# Patient Record
Sex: MALE | Race: WHITE | Employment: FULL TIME | URBAN - METROPOLITAN AREA
[De-identification: names, ages, dates, MRNs, and addresses within clinical notes are randomized per-mention and may not be internally consistent; named-entity substitution may affect disease eponyms.]

---

## 2017-03-24 ENCOUNTER — OFFICE VISIT (OUTPATIENT)
Dept: INTERNAL MEDICINE CLINIC | Age: 52
End: 2017-03-24

## 2017-03-24 VITALS
SYSTOLIC BLOOD PRESSURE: 102 MMHG | OXYGEN SATURATION: 98 % | DIASTOLIC BLOOD PRESSURE: 70 MMHG | BODY MASS INDEX: 30.52 KG/M2 | WEIGHT: 201.4 LBS | HEART RATE: 58 BPM | HEIGHT: 68 IN

## 2017-03-24 DIAGNOSIS — E66.9 OBESITY (BMI 30-39.9): Chronic | ICD-10-CM

## 2017-03-24 DIAGNOSIS — E78.2 MIXED HYPERLIPIDEMIA: Chronic | ICD-10-CM

## 2017-03-24 DIAGNOSIS — K62.5 RECTAL BLEEDING: Chronic | ICD-10-CM

## 2017-03-24 DIAGNOSIS — N40.1 BENIGN PROSTATIC HYPERPLASIA WITH LOWER URINARY TRACT SYMPTOMS, UNSPECIFIED MORPHOLOGY: ICD-10-CM

## 2017-03-24 DIAGNOSIS — R68.82 LOSS OF LIBIDO: ICD-10-CM

## 2017-03-24 DIAGNOSIS — K21.9 GASTROESOPHAGEAL REFLUX DISEASE WITHOUT ESOPHAGITIS: Chronic | ICD-10-CM

## 2017-03-24 DIAGNOSIS — K76.9 CHRONIC NONALCOHOLIC LIVER DISEASE: ICD-10-CM

## 2017-03-24 DIAGNOSIS — E11.9 TYPE 2 DIABETES MELLITUS WITHOUT COMPLICATION, WITHOUT LONG-TERM CURRENT USE OF INSULIN (HCC): Primary | Chronic | ICD-10-CM

## 2017-03-24 LAB
A/G RATIO: 1.4 (ref 1.1–2.2)
ALBUMIN SERPL-MCNC: 4.6 G/DL (ref 3.4–5)
ALP BLD-CCNC: 61 U/L (ref 40–129)
ALT SERPL-CCNC: 29 U/L (ref 10–40)
ANION GAP SERPL CALCULATED.3IONS-SCNC: 15 MMOL/L (ref 3–16)
AST SERPL-CCNC: 17 U/L (ref 15–37)
BASOPHILS ABSOLUTE: 0.1 K/UL (ref 0–0.2)
BASOPHILS RELATIVE PERCENT: 1 %
BILIRUB SERPL-MCNC: 0.6 MG/DL (ref 0–1)
BUN BLDV-MCNC: 19 MG/DL (ref 7–20)
CALCIUM SERPL-MCNC: 9.5 MG/DL (ref 8.3–10.6)
CHLORIDE BLD-SCNC: 97 MMOL/L (ref 99–110)
CHOLESTEROL, TOTAL: 188 MG/DL (ref 0–199)
CO2: 23 MMOL/L (ref 21–32)
CREAT SERPL-MCNC: 0.9 MG/DL (ref 0.9–1.3)
CREATININE URINE: 59.9 MG/DL (ref 39–259)
EOSINOPHILS ABSOLUTE: 0.3 K/UL (ref 0–0.6)
EOSINOPHILS RELATIVE PERCENT: 3.3 %
GFR AFRICAN AMERICAN: >60
GFR NON-AFRICAN AMERICAN: >60
GLOBULIN: 3.2 G/DL
GLUCOSE BLD-MCNC: 304 MG/DL (ref 70–99)
HCT VFR BLD CALC: 48 % (ref 40.5–52.5)
HDLC SERPL-MCNC: 48 MG/DL (ref 40–60)
HEMOGLOBIN: 16.2 G/DL (ref 13.5–17.5)
LDL CHOLESTEROL CALCULATED: 107 MG/DL
LIPASE: 437 U/L (ref 13–60)
LYMPHOCYTES ABSOLUTE: 2.5 K/UL (ref 1–5.1)
LYMPHOCYTES RELATIVE PERCENT: 27.4 %
MCH RBC QN AUTO: 29.2 PG (ref 26–34)
MCHC RBC AUTO-ENTMCNC: 33.7 G/DL (ref 31–36)
MCV RBC AUTO: 86.7 FL (ref 80–100)
MICROALBUMIN UR-MCNC: <1.2 MG/DL
MICROALBUMIN/CREAT UR-RTO: NORMAL MG/G (ref 0–30)
MONOCYTES ABSOLUTE: 0.6 K/UL (ref 0–1.3)
MONOCYTES RELATIVE PERCENT: 7.2 %
NEUTROPHILS ABSOLUTE: 5.5 K/UL (ref 1.7–7.7)
NEUTROPHILS RELATIVE PERCENT: 61.1 %
PDW BLD-RTO: 13.7 % (ref 12.4–15.4)
PLATELET # BLD: 240 K/UL (ref 135–450)
PMV BLD AUTO: 9.8 FL (ref 5–10.5)
POTASSIUM SERPL-SCNC: 4.7 MMOL/L (ref 3.5–5.1)
PROSTATE SPECIFIC ANTIGEN: 0.81 NG/ML (ref 0–4)
RBC # BLD: 5.54 M/UL (ref 4.2–5.9)
SODIUM BLD-SCNC: 135 MMOL/L (ref 136–145)
TOTAL PROTEIN: 7.8 G/DL (ref 6.4–8.2)
TRIGL SERPL-MCNC: 164 MG/DL (ref 0–150)
TSH SERPL DL<=0.05 MIU/L-ACNC: 3.16 UIU/ML (ref 0.27–4.2)
VITAMIN D 25-HYDROXY: 24.9 NG/ML
VLDLC SERPL CALC-MCNC: 33 MG/DL
WBC # BLD: 9 K/UL (ref 4–11)

## 2017-03-24 PROCEDURE — 99204 OFFICE O/P NEW MOD 45 MIN: CPT | Performed by: INTERNAL MEDICINE

## 2017-03-24 RX ORDER — METFORMIN HYDROCHLORIDE 500 MG/1
500 TABLET, EXTENDED RELEASE ORAL NIGHTLY
Qty: 30 TABLET | Refills: 3 | Status: SHIPPED | OUTPATIENT
Start: 2017-03-24 | End: 2019-02-18

## 2017-03-24 RX ORDER — LANCETS
EACH MISCELLANEOUS
Qty: 100 EACH | Refills: 3 | Status: SHIPPED | OUTPATIENT
Start: 2017-03-24 | End: 2019-02-18

## 2017-03-24 RX ORDER — BLOOD-GLUCOSE METER
EACH MISCELLANEOUS
Qty: 1 KIT | Refills: 0 | Status: SHIPPED | OUTPATIENT
Start: 2017-03-24 | End: 2019-02-18

## 2017-03-24 RX ORDER — TAMSULOSIN HYDROCHLORIDE 0.4 MG/1
0.4 CAPSULE ORAL NIGHTLY
Qty: 30 CAPSULE | Refills: 3 | Status: SHIPPED | OUTPATIENT
Start: 2017-03-24 | End: 2019-02-18

## 2017-03-25 LAB
ESTIMATED AVERAGE GLUCOSE: 314.9 MG/DL
HBA1C MFR BLD: 12.6 %

## 2017-03-26 LAB
SEX HORMONE BINDING GLOBULIN: 20 NMOL/L (ref 11–80)
TESTOSTERONE FREE PERCENT: 2.2 % (ref 1.6–2.9)
TESTOSTERONE FREE, CALC: 48 PG/ML (ref 47–244)
TESTOSTERONE TOTAL-MALE: 217 NG/DL (ref 300–890)

## 2017-04-05 ASSESSMENT — ENCOUNTER SYMPTOMS
HEMOPTYSIS: 0
BLOOD IN STOOL: 0
DIARRHEA: 0
SPUTUM PRODUCTION: 0
STRIDOR: 0
EYE PAIN: 0
PHOTOPHOBIA: 0
BLURRED VISION: 0
WHEEZING: 0
GASTROINTESTINAL NEGATIVE: 1
VOMITING: 0
HEARTBURN: 0
COUGH: 0
ABDOMINAL PAIN: 0
EYES NEGATIVE: 1
NAUSEA: 0
BACK PAIN: 0
CONSTIPATION: 0
ORTHOPNEA: 0
SHORTNESS OF BREATH: 0
DOUBLE VISION: 0
EYE DISCHARGE: 0
RESPIRATORY NEGATIVE: 1
SORE THROAT: 0
EYE REDNESS: 0

## 2018-03-27 ENCOUNTER — TELEPHONE (OUTPATIENT)
Dept: INTERNAL MEDICINE CLINIC | Age: 53
End: 2018-03-27

## 2019-02-18 ENCOUNTER — ANESTHESIA EVENT (OUTPATIENT)
Dept: ENDOSCOPY | Age: 54
End: 2019-02-18
Payer: COMMERCIAL

## 2019-02-19 ENCOUNTER — ANESTHESIA (OUTPATIENT)
Dept: ENDOSCOPY | Age: 54
End: 2019-02-19
Payer: COMMERCIAL

## 2019-02-19 ENCOUNTER — TELEPHONE (OUTPATIENT)
Dept: SURGERY | Age: 54
End: 2019-02-19

## 2019-02-19 ENCOUNTER — HOSPITAL ENCOUNTER (OUTPATIENT)
Age: 54
Setting detail: OUTPATIENT SURGERY
Discharge: HOME | End: 2019-02-19
Attending: INTERNAL MEDICINE | Admitting: INTERNAL MEDICINE
Payer: COMMERCIAL

## 2019-02-19 VITALS — DIASTOLIC BLOOD PRESSURE: 77 MMHG | OXYGEN SATURATION: 96 % | TEMPERATURE: 98.6 F | SYSTOLIC BLOOD PRESSURE: 108 MMHG

## 2019-02-19 VITALS
SYSTOLIC BLOOD PRESSURE: 108 MMHG | OXYGEN SATURATION: 99 % | DIASTOLIC BLOOD PRESSURE: 80 MMHG | RESPIRATION RATE: 18 BRPM | BODY MASS INDEX: 30.13 KG/M2 | HEIGHT: 68 IN | WEIGHT: 198.8 LBS | TEMPERATURE: 97.1 F | HEART RATE: 68 BPM

## 2019-02-19 LAB
GLUCOSE BLD-MCNC: 305 MG/DL (ref 70–99)
PERFORMED ON: ABNORMAL

## 2019-02-19 PROCEDURE — 3609009500 HC COLONOSCOPY DIAGNOSTIC OR SCREENING: Performed by: INTERNAL MEDICINE

## 2019-02-19 PROCEDURE — 3700000000 HC ANESTHESIA ATTENDED CARE: Performed by: INTERNAL MEDICINE

## 2019-02-19 PROCEDURE — 7100000011 HC PHASE II RECOVERY - ADDTL 15 MIN: Performed by: INTERNAL MEDICINE

## 2019-02-19 PROCEDURE — 7100000010 HC PHASE II RECOVERY - FIRST 15 MIN: Performed by: INTERNAL MEDICINE

## 2019-02-19 PROCEDURE — 6360000002 HC RX W HCPCS: Performed by: NURSE ANESTHETIST, CERTIFIED REGISTERED

## 2019-02-19 PROCEDURE — 2580000003 HC RX 258: Performed by: NURSE ANESTHETIST, CERTIFIED REGISTERED

## 2019-02-19 PROCEDURE — 2580000003 HC RX 258: Performed by: ANESTHESIOLOGY

## 2019-02-19 PROCEDURE — 2500000003 HC RX 250 WO HCPCS: Performed by: NURSE ANESTHETIST, CERTIFIED REGISTERED

## 2019-02-19 RX ORDER — LIDOCAINE HYDROCHLORIDE 20 MG/ML
INJECTION, SOLUTION EPIDURAL; INFILTRATION; INTRACAUDAL; PERINEURAL PRN
Status: DISCONTINUED | OUTPATIENT
Start: 2019-02-19 | End: 2019-02-19 | Stop reason: SDUPTHER

## 2019-02-19 RX ORDER — SODIUM CHLORIDE 9 MG/ML
INJECTION, SOLUTION INTRAVENOUS CONTINUOUS
Status: DISCONTINUED | OUTPATIENT
Start: 2019-02-19 | End: 2019-02-19 | Stop reason: HOSPADM

## 2019-02-19 RX ORDER — PROPOFOL 10 MG/ML
INJECTION, EMULSION INTRAVENOUS PRN
Status: DISCONTINUED | OUTPATIENT
Start: 2019-02-19 | End: 2019-02-19 | Stop reason: SDUPTHER

## 2019-02-19 RX ORDER — SODIUM CHLORIDE 9 MG/ML
INJECTION, SOLUTION INTRAVENOUS CONTINUOUS PRN
Status: DISCONTINUED | OUTPATIENT
Start: 2019-02-19 | End: 2019-02-19 | Stop reason: SDUPTHER

## 2019-02-19 RX ORDER — SODIUM CHLORIDE 0.9 % (FLUSH) 0.9 %
10 SYRINGE (ML) INJECTION EVERY 12 HOURS SCHEDULED
Status: DISCONTINUED | OUTPATIENT
Start: 2019-02-19 | End: 2019-02-19 | Stop reason: HOSPADM

## 2019-02-19 RX ORDER — SODIUM CHLORIDE 0.9 % (FLUSH) 0.9 %
10 SYRINGE (ML) INJECTION PRN
Status: DISCONTINUED | OUTPATIENT
Start: 2019-02-19 | End: 2019-02-19 | Stop reason: HOSPADM

## 2019-02-19 RX ADMIN — PROPOFOL 150 MG: 10 INJECTION, EMULSION INTRAVENOUS at 11:47

## 2019-02-19 RX ADMIN — SODIUM CHLORIDE: 9 INJECTION, SOLUTION INTRAVENOUS at 11:41

## 2019-02-19 RX ADMIN — LIDOCAINE HYDROCHLORIDE 100 MG: 20 INJECTION, SOLUTION EPIDURAL; INFILTRATION; INTRACAUDAL; PERINEURAL at 11:44

## 2019-02-19 RX ADMIN — PROPOFOL 150 MG: 10 INJECTION, EMULSION INTRAVENOUS at 11:44

## 2019-02-19 RX ADMIN — SODIUM CHLORIDE: 0.9 INJECTION, SOLUTION INTRAVENOUS at 11:12

## 2019-02-19 ASSESSMENT — PAIN SCALES - GENERAL: PAINLEVEL_OUTOF10: 0

## 2019-02-19 ASSESSMENT — LIFESTYLE VARIABLES: SMOKING_STATUS: 0

## 2019-02-19 ASSESSMENT — PAIN - FUNCTIONAL ASSESSMENT: PAIN_FUNCTIONAL_ASSESSMENT: 0-10

## 2019-02-19 ASSESSMENT — PAIN SCALES - WONG BAKER
WONGBAKER_NUMERICALRESPONSE: 0
WONGBAKER_NUMERICALRESPONSE: 0

## 2020-08-18 ASSESSMENT — PAIN DESCRIPTION - DESCRIPTORS: DESCRIPTORS: SHARP;STABBING

## 2020-08-18 ASSESSMENT — PAIN DESCRIPTION - LOCATION: LOCATION: ABDOMEN

## 2020-08-18 ASSESSMENT — PAIN SCALES - GENERAL: PAINLEVEL_OUTOF10: 8

## 2020-08-18 ASSESSMENT — PAIN DESCRIPTION - ORIENTATION: ORIENTATION: LOWER;MID;INNER

## 2020-08-18 ASSESSMENT — PAIN DESCRIPTION - PAIN TYPE: TYPE: ACUTE PAIN

## 2020-08-19 ENCOUNTER — HOSPITAL ENCOUNTER (INPATIENT)
Age: 55
LOS: 2 days | Discharge: HOME HEALTH | DRG: 872 | End: 2020-08-21
Attending: EMERGENCY MEDICINE | Admitting: SURGERY
Payer: COMMERCIAL

## 2020-08-19 ENCOUNTER — APPOINTMENT (OUTPATIENT)
Dept: CT IMAGING | Age: 55
DRG: 872 | End: 2020-08-19
Payer: COMMERCIAL

## 2020-08-19 PROBLEM — K57.32 SIGMOID DIVERTICULITIS: Status: ACTIVE | Noted: 2020-08-19

## 2020-08-19 LAB
A/G RATIO: 1.1 (ref 1.1–2.2)
A/G RATIO: 1.2 (ref 1.1–2.2)
ALBUMIN SERPL-MCNC: 4.2 G/DL (ref 3.4–5)
ALBUMIN SERPL-MCNC: 4.2 G/DL (ref 3.4–5)
ALP BLD-CCNC: 44 U/L (ref 40–129)
ALP BLD-CCNC: 46 U/L (ref 40–129)
ALT SERPL-CCNC: 15 U/L (ref 10–40)
ALT SERPL-CCNC: 17 U/L (ref 10–40)
ANION GAP SERPL CALCULATED.3IONS-SCNC: 11 MMOL/L (ref 3–16)
ANION GAP SERPL CALCULATED.3IONS-SCNC: 14 MMOL/L (ref 3–16)
AST SERPL-CCNC: 13 U/L (ref 15–37)
AST SERPL-CCNC: 17 U/L (ref 15–37)
BASOPHILS ABSOLUTE: 0.1 K/UL (ref 0–0.2)
BASOPHILS ABSOLUTE: 0.1 K/UL (ref 0–0.2)
BASOPHILS RELATIVE PERCENT: 0.7 %
BASOPHILS RELATIVE PERCENT: 0.7 %
BILIRUB SERPL-MCNC: 1.8 MG/DL (ref 0–1)
BILIRUB SERPL-MCNC: 1.9 MG/DL (ref 0–1)
BILIRUBIN URINE: NEGATIVE
BLOOD, URINE: NEGATIVE
BUN BLDV-MCNC: 12 MG/DL (ref 7–20)
BUN BLDV-MCNC: 13 MG/DL (ref 7–20)
CALCIUM SERPL-MCNC: 10.2 MG/DL (ref 8.3–10.6)
CALCIUM SERPL-MCNC: 9 MG/DL (ref 8.3–10.6)
CHLORIDE BLD-SCNC: 98 MMOL/L (ref 99–110)
CHLORIDE BLD-SCNC: 99 MMOL/L (ref 99–110)
CHOLESTEROL, TOTAL: 144 MG/DL (ref 0–199)
CLARITY: CLEAR
CO2: 22 MMOL/L (ref 21–32)
CO2: 25 MMOL/L (ref 21–32)
COLOR: YELLOW
CREAT SERPL-MCNC: 0.6 MG/DL (ref 0.9–1.3)
CREAT SERPL-MCNC: 0.7 MG/DL (ref 0.9–1.3)
EKG ATRIAL RATE: 94 BPM
EKG DIAGNOSIS: NORMAL
EKG P AXIS: 48 DEGREES
EKG P-R INTERVAL: 150 MS
EKG Q-T INTERVAL: 354 MS
EKG QRS DURATION: 74 MS
EKG QTC CALCULATION (BAZETT): 442 MS
EKG R AXIS: -25 DEGREES
EKG T AXIS: 35 DEGREES
EKG VENTRICULAR RATE: 94 BPM
EOSINOPHILS ABSOLUTE: 0.1 K/UL (ref 0–0.6)
EOSINOPHILS ABSOLUTE: 0.1 K/UL (ref 0–0.6)
EOSINOPHILS RELATIVE PERCENT: 0.6 %
EOSINOPHILS RELATIVE PERCENT: 0.8 %
ESTIMATED AVERAGE GLUCOSE: 208.7 MG/DL
GFR AFRICAN AMERICAN: >60
GFR AFRICAN AMERICAN: >60
GFR NON-AFRICAN AMERICAN: >60
GFR NON-AFRICAN AMERICAN: >60
GLOBULIN: 3.5 G/DL
GLOBULIN: 3.8 G/DL
GLUCOSE BLD-MCNC: 155 MG/DL (ref 70–99)
GLUCOSE BLD-MCNC: 160 MG/DL (ref 70–99)
GLUCOSE BLD-MCNC: 173 MG/DL (ref 70–99)
GLUCOSE BLD-MCNC: 191 MG/DL (ref 70–99)
GLUCOSE BLD-MCNC: 192 MG/DL (ref 70–99)
GLUCOSE BLD-MCNC: 205 MG/DL (ref 70–99)
GLUCOSE BLD-MCNC: 77 MG/DL (ref 70–99)
GLUCOSE URINE: 100 MG/DL
HBA1C MFR BLD: 8.9 %
HCT VFR BLD CALC: 44.7 % (ref 40.5–52.5)
HCT VFR BLD CALC: 45.2 % (ref 40.5–52.5)
HDLC SERPL-MCNC: 52 MG/DL (ref 40–60)
HEMOGLOBIN: 15.3 G/DL (ref 13.5–17.5)
HEMOGLOBIN: 15.5 G/DL (ref 13.5–17.5)
KETONES, URINE: NEGATIVE MG/DL
LACTIC ACID: 1 MMOL/L (ref 0.4–2)
LACTIC ACID: 1.2 MMOL/L (ref 0.4–2)
LDL CHOLESTEROL CALCULATED: 79 MG/DL
LEUKOCYTE ESTERASE, URINE: NEGATIVE
LIPASE: 21 U/L (ref 13–60)
LYMPHOCYTES ABSOLUTE: 1.8 K/UL (ref 1–5.1)
LYMPHOCYTES ABSOLUTE: 2.1 K/UL (ref 1–5.1)
LYMPHOCYTES RELATIVE PERCENT: 10.4 %
LYMPHOCYTES RELATIVE PERCENT: 11.3 %
MAGNESIUM: 2.1 MG/DL (ref 1.8–2.4)
MCH RBC QN AUTO: 28.9 PG (ref 26–34)
MCH RBC QN AUTO: 29.6 PG (ref 26–34)
MCHC RBC AUTO-ENTMCNC: 33.9 G/DL (ref 31–36)
MCHC RBC AUTO-ENTMCNC: 34.6 G/DL (ref 31–36)
MCV RBC AUTO: 85.1 FL (ref 80–100)
MCV RBC AUTO: 85.3 FL (ref 80–100)
MICROSCOPIC EXAMINATION: ABNORMAL
MONOCYTES ABSOLUTE: 1.1 K/UL (ref 0–1.3)
MONOCYTES ABSOLUTE: 1.2 K/UL (ref 0–1.3)
MONOCYTES RELATIVE PERCENT: 6.7 %
MONOCYTES RELATIVE PERCENT: 6.8 %
NEUTROPHILS ABSOLUTE: 13.8 K/UL (ref 1.7–7.7)
NEUTROPHILS ABSOLUTE: 14.8 K/UL (ref 1.7–7.7)
NEUTROPHILS RELATIVE PERCENT: 80.5 %
NEUTROPHILS RELATIVE PERCENT: 81.5 %
NITRITE, URINE: NEGATIVE
PDW BLD-RTO: 13.4 % (ref 12.4–15.4)
PDW BLD-RTO: 13.7 % (ref 12.4–15.4)
PERFORMED ON: ABNORMAL
PERFORMED ON: NORMAL
PH UA: 6 (ref 5–8)
PLATELET # BLD: 216 K/UL (ref 135–450)
PLATELET # BLD: 242 K/UL (ref 135–450)
PMV BLD AUTO: 8.4 FL (ref 5–10.5)
PMV BLD AUTO: 8.9 FL (ref 5–10.5)
POTASSIUM REFLEX MAGNESIUM: 4 MMOL/L (ref 3.5–5.1)
POTASSIUM SERPL-SCNC: 4.5 MMOL/L (ref 3.5–5.1)
PROTEIN UA: NEGATIVE MG/DL
RBC # BLD: 5.23 M/UL (ref 4.2–5.9)
RBC # BLD: 5.31 M/UL (ref 4.2–5.9)
SODIUM BLD-SCNC: 134 MMOL/L (ref 136–145)
SODIUM BLD-SCNC: 135 MMOL/L (ref 136–145)
SPECIFIC GRAVITY UA: 1.01 (ref 1–1.03)
TOTAL PROTEIN: 7.7 G/DL (ref 6.4–8.2)
TOTAL PROTEIN: 8 G/DL (ref 6.4–8.2)
TRIGL SERPL-MCNC: 63 MG/DL (ref 0–150)
URINE REFLEX TO CULTURE: ABNORMAL
URINE TYPE: ABNORMAL
UROBILINOGEN, URINE: 0.2 E.U./DL
VLDLC SERPL CALC-MCNC: 13 MG/DL
WBC # BLD: 16.9 K/UL (ref 4–11)
WBC # BLD: 18.4 K/UL (ref 4–11)

## 2020-08-19 PROCEDURE — 83036 HEMOGLOBIN GLYCOSYLATED A1C: CPT

## 2020-08-19 PROCEDURE — 74177 CT ABD & PELVIS W/CONTRAST: CPT

## 2020-08-19 PROCEDURE — 94760 N-INVAS EAR/PLS OXIMETRY 1: CPT

## 2020-08-19 PROCEDURE — 6360000002 HC RX W HCPCS: Performed by: EMERGENCY MEDICINE

## 2020-08-19 PROCEDURE — 96374 THER/PROPH/DIAG INJ IV PUSH: CPT

## 2020-08-19 PROCEDURE — 2580000003 HC RX 258: Performed by: SURGERY

## 2020-08-19 PROCEDURE — 2500000003 HC RX 250 WO HCPCS: Performed by: SURGERY

## 2020-08-19 PROCEDURE — 36415 COLL VENOUS BLD VENIPUNCTURE: CPT

## 2020-08-19 PROCEDURE — 83735 ASSAY OF MAGNESIUM: CPT

## 2020-08-19 PROCEDURE — APPNB180 APP NON BILLABLE TIME > 60 MINS: Performed by: PHYSICIAN ASSISTANT

## 2020-08-19 PROCEDURE — 81003 URINALYSIS AUTO W/O SCOPE: CPT

## 2020-08-19 PROCEDURE — 99285 EMERGENCY DEPT VISIT HI MDM: CPT

## 2020-08-19 PROCEDURE — 85025 COMPLETE CBC W/AUTO DIFF WBC: CPT

## 2020-08-19 PROCEDURE — 6370000000 HC RX 637 (ALT 250 FOR IP): Performed by: SURGERY

## 2020-08-19 PROCEDURE — 1200000000 HC SEMI PRIVATE

## 2020-08-19 PROCEDURE — APPSS180 APP SPLIT SHARED TIME > 60 MINUTES: Performed by: NURSE PRACTITIONER

## 2020-08-19 PROCEDURE — 93010 ELECTROCARDIOGRAM REPORT: CPT | Performed by: INTERNAL MEDICINE

## 2020-08-19 PROCEDURE — 93005 ELECTROCARDIOGRAM TRACING: CPT | Performed by: EMERGENCY MEDICINE

## 2020-08-19 PROCEDURE — 83605 ASSAY OF LACTIC ACID: CPT

## 2020-08-19 PROCEDURE — 6360000004 HC RX CONTRAST MEDICATION: Performed by: EMERGENCY MEDICINE

## 2020-08-19 PROCEDURE — 6360000002 HC RX W HCPCS: Performed by: SURGERY

## 2020-08-19 PROCEDURE — 6370000000 HC RX 637 (ALT 250 FOR IP): Performed by: INTERNAL MEDICINE

## 2020-08-19 PROCEDURE — 80061 LIPID PANEL: CPT

## 2020-08-19 PROCEDURE — 80053 COMPREHEN METABOLIC PANEL: CPT

## 2020-08-19 PROCEDURE — 83690 ASSAY OF LIPASE: CPT

## 2020-08-19 PROCEDURE — 99222 1ST HOSP IP/OBS MODERATE 55: CPT | Performed by: SURGERY

## 2020-08-19 RX ORDER — MAGNESIUM SULFATE IN WATER 40 MG/ML
2 INJECTION, SOLUTION INTRAVENOUS PRN
Status: DISCONTINUED | OUTPATIENT
Start: 2020-08-19 | End: 2020-08-21 | Stop reason: HOSPADM

## 2020-08-19 RX ORDER — DEXTROSE MONOHYDRATE 25 G/50ML
12.5 INJECTION, SOLUTION INTRAVENOUS PRN
Status: DISCONTINUED | OUTPATIENT
Start: 2020-08-19 | End: 2020-08-21 | Stop reason: HOSPADM

## 2020-08-19 RX ORDER — DEXTROSE MONOHYDRATE 50 MG/ML
100 INJECTION, SOLUTION INTRAVENOUS PRN
Status: DISCONTINUED | OUTPATIENT
Start: 2020-08-19 | End: 2020-08-21 | Stop reason: HOSPADM

## 2020-08-19 RX ORDER — ONDANSETRON 2 MG/ML
4 INJECTION INTRAMUSCULAR; INTRAVENOUS EVERY 6 HOURS PRN
Status: DISCONTINUED | OUTPATIENT
Start: 2020-08-19 | End: 2020-08-21 | Stop reason: HOSPADM

## 2020-08-19 RX ORDER — INSULIN GLARGINE 100 [IU]/ML
41 INJECTION, SOLUTION SUBCUTANEOUS NIGHTLY
Status: DISCONTINUED | OUTPATIENT
Start: 2020-08-19 | End: 2020-08-21 | Stop reason: HOSPADM

## 2020-08-19 RX ORDER — SODIUM CHLORIDE 9 MG/ML
INJECTION, SOLUTION INTRAVENOUS CONTINUOUS
Status: DISCONTINUED | OUTPATIENT
Start: 2020-08-19 | End: 2020-08-20

## 2020-08-19 RX ORDER — ACETAMINOPHEN 325 MG/1
650 TABLET ORAL EVERY 4 HOURS PRN
Status: DISCONTINUED | OUTPATIENT
Start: 2020-08-19 | End: 2020-08-21 | Stop reason: HOSPADM

## 2020-08-19 RX ORDER — TAMSULOSIN HYDROCHLORIDE 0.4 MG/1
0.4 CAPSULE ORAL DAILY
Status: DISCONTINUED | OUTPATIENT
Start: 2020-08-19 | End: 2020-08-21 | Stop reason: HOSPADM

## 2020-08-19 RX ORDER — OXYCODONE HYDROCHLORIDE 5 MG/1
5 TABLET ORAL EVERY 4 HOURS PRN
Status: DISCONTINUED | OUTPATIENT
Start: 2020-08-19 | End: 2020-08-21 | Stop reason: HOSPADM

## 2020-08-19 RX ORDER — NICOTINE POLACRILEX 4 MG
15 LOZENGE BUCCAL PRN
Status: DISCONTINUED | OUTPATIENT
Start: 2020-08-19 | End: 2020-08-21 | Stop reason: HOSPADM

## 2020-08-19 RX ORDER — POTASSIUM CHLORIDE 7.45 MG/ML
10 INJECTION INTRAVENOUS PRN
Status: DISCONTINUED | OUTPATIENT
Start: 2020-08-19 | End: 2020-08-21 | Stop reason: HOSPADM

## 2020-08-19 RX ORDER — INSULIN GLARGINE 100 [IU]/ML
0.25 INJECTION, SOLUTION SUBCUTANEOUS NIGHTLY
Status: DISCONTINUED | OUTPATIENT
Start: 2020-08-19 | End: 2020-08-19

## 2020-08-19 RX ORDER — MORPHINE SULFATE 4 MG/ML
4 INJECTION, SOLUTION INTRAMUSCULAR; INTRAVENOUS ONCE
Status: COMPLETED | OUTPATIENT
Start: 2020-08-19 | End: 2020-08-19

## 2020-08-19 RX ORDER — OXYCODONE HYDROCHLORIDE 10 MG/1
10 TABLET ORAL EVERY 4 HOURS PRN
Status: DISCONTINUED | OUTPATIENT
Start: 2020-08-19 | End: 2020-08-21 | Stop reason: HOSPADM

## 2020-08-19 RX ORDER — CIPROFLOXACIN 2 MG/ML
400 INJECTION, SOLUTION INTRAVENOUS EVERY 12 HOURS
Status: DISCONTINUED | OUTPATIENT
Start: 2020-08-19 | End: 2020-08-21 | Stop reason: HOSPADM

## 2020-08-19 RX ORDER — SODIUM CHLORIDE 0.9 % (FLUSH) 0.9 %
10 SYRINGE (ML) INJECTION EVERY 12 HOURS SCHEDULED
Status: DISCONTINUED | OUTPATIENT
Start: 2020-08-19 | End: 2020-08-21 | Stop reason: HOSPADM

## 2020-08-19 RX ORDER — SODIUM CHLORIDE 0.9 % (FLUSH) 0.9 %
10 SYRINGE (ML) INJECTION PRN
Status: DISCONTINUED | OUTPATIENT
Start: 2020-08-19 | End: 2020-08-21 | Stop reason: HOSPADM

## 2020-08-19 RX ORDER — 0.9 % SODIUM CHLORIDE 0.9 %
1000 INTRAVENOUS SOLUTION INTRAVENOUS ONCE
Status: DISCONTINUED | OUTPATIENT
Start: 2020-08-19 | End: 2020-08-21 | Stop reason: HOSPADM

## 2020-08-19 RX ORDER — MORPHINE SULFATE 4 MG/ML
4 INJECTION, SOLUTION INTRAMUSCULAR; INTRAVENOUS ONCE
Status: DISCONTINUED | OUTPATIENT
Start: 2020-08-19 | End: 2020-08-21 | Stop reason: HOSPADM

## 2020-08-19 RX ADMIN — SODIUM CHLORIDE: 9 INJECTION, SOLUTION INTRAVENOUS at 18:49

## 2020-08-19 RX ADMIN — INSULIN LISPRO 4 UNITS: 100 INJECTION, SOLUTION INTRAVENOUS; SUBCUTANEOUS at 08:30

## 2020-08-19 RX ADMIN — FAMOTIDINE 20 MG: 10 INJECTION, SOLUTION INTRAVENOUS at 08:29

## 2020-08-19 RX ADMIN — INSULIN GLARGINE 41 UNITS: 100 INJECTION, SOLUTION SUBCUTANEOUS at 20:23

## 2020-08-19 RX ADMIN — IOPAMIDOL 75 ML: 755 INJECTION, SOLUTION INTRAVENOUS at 04:06

## 2020-08-19 RX ADMIN — CIPROFLOXACIN 400 MG: 2 INJECTION, SOLUTION INTRAVENOUS at 12:12

## 2020-08-19 RX ADMIN — METRONIDAZOLE 500 MG: 500 INJECTION, SOLUTION INTRAVENOUS at 13:17

## 2020-08-19 RX ADMIN — INSULIN LISPRO 1 UNITS: 100 INJECTION, SOLUTION INTRAVENOUS; SUBCUTANEOUS at 20:24

## 2020-08-19 RX ADMIN — ACETAMINOPHEN 650 MG: 325 TABLET ORAL at 08:50

## 2020-08-19 RX ADMIN — METRONIDAZOLE 500 MG: 500 INJECTION, SOLUTION INTRAVENOUS at 20:23

## 2020-08-19 RX ADMIN — ENOXAPARIN SODIUM 40 MG: 40 INJECTION SUBCUTANEOUS at 08:30

## 2020-08-19 RX ADMIN — PIPERACILLIN AND TAZOBACTAM 3.38 G: 3; .375 INJECTION, POWDER, LYOPHILIZED, FOR SOLUTION INTRAVENOUS at 08:29

## 2020-08-19 RX ADMIN — FAMOTIDINE 20 MG: 10 INJECTION, SOLUTION INTRAVENOUS at 20:23

## 2020-08-19 RX ADMIN — TAMSULOSIN HYDROCHLORIDE 0.4 MG: 0.4 CAPSULE ORAL at 15:56

## 2020-08-19 RX ADMIN — SODIUM CHLORIDE: 9 INJECTION, SOLUTION INTRAVENOUS at 07:01

## 2020-08-19 RX ADMIN — MORPHINE SULFATE 4 MG: 4 INJECTION, SOLUTION INTRAMUSCULAR; INTRAVENOUS at 02:48

## 2020-08-19 RX ADMIN — ACETAMINOPHEN 650 MG: 325 TABLET ORAL at 18:48

## 2020-08-19 RX ADMIN — INSULIN LISPRO 5 UNITS: 100 INJECTION, SOLUTION INTRAVENOUS; SUBCUTANEOUS at 17:32

## 2020-08-19 RX ADMIN — INSULIN LISPRO 2 UNITS: 100 INJECTION, SOLUTION INTRAVENOUS; SUBCUTANEOUS at 11:59

## 2020-08-19 RX ADMIN — INSULIN LISPRO 2 UNITS: 100 INJECTION, SOLUTION INTRAVENOUS; SUBCUTANEOUS at 17:32

## 2020-08-19 ASSESSMENT — PAIN DESCRIPTION - DIRECTION
RADIATING_TOWARDS: UPPER ABD
RADIATING_TOWARDS: UPPER ABDOMEN

## 2020-08-19 ASSESSMENT — ENCOUNTER SYMPTOMS
EYE REDNESS: 0
ANAL BLEEDING: 0
COUGH: 0
EYE PAIN: 0
PHOTOPHOBIA: 0
STRIDOR: 0
CHOKING: 0
SHORTNESS OF BREATH: 0
BACK PAIN: 0
EYE DISCHARGE: 0
CHEST TIGHTNESS: 0
BLOOD IN STOOL: 0
DIARRHEA: 0
NAUSEA: 1
WHEEZING: 0
VOMITING: 1
CONSTIPATION: 1
RECTAL PAIN: 0
ABDOMINAL PAIN: 1
EYE ITCHING: 0
ABDOMINAL DISTENTION: 0
COLOR CHANGE: 0
APNEA: 0

## 2020-08-19 ASSESSMENT — PAIN DESCRIPTION - FREQUENCY
FREQUENCY: CONTINUOUS

## 2020-08-19 ASSESSMENT — PAIN - FUNCTIONAL ASSESSMENT
PAIN_FUNCTIONAL_ASSESSMENT: PREVENTS OR INTERFERES SOME ACTIVE ACTIVITIES AND ADLS
PAIN_FUNCTIONAL_ASSESSMENT: ACTIVITIES ARE NOT PREVENTED
PAIN_FUNCTIONAL_ASSESSMENT: PREVENTS OR INTERFERES SOME ACTIVE ACTIVITIES AND ADLS

## 2020-08-19 ASSESSMENT — PAIN DESCRIPTION - DESCRIPTORS
DESCRIPTORS: SHARP;STABBING;RADIATING
DESCRIPTORS: SPASM;SHARP
DESCRIPTORS: SHARP;STABBING

## 2020-08-19 ASSESSMENT — PAIN SCALES - GENERAL
PAINLEVEL_OUTOF10: 4
PAINLEVEL_OUTOF10: 4
PAINLEVEL_OUTOF10: 6
PAINLEVEL_OUTOF10: 7
PAINLEVEL_OUTOF10: 8
PAINLEVEL_OUTOF10: 6

## 2020-08-19 ASSESSMENT — PAIN DESCRIPTION - PAIN TYPE
TYPE: ACUTE PAIN

## 2020-08-19 ASSESSMENT — PAIN DESCRIPTION - ONSET
ONSET: ON-GOING

## 2020-08-19 ASSESSMENT — PAIN DESCRIPTION - ORIENTATION
ORIENTATION: LOWER;MID;INNER
ORIENTATION: LOWER;MID
ORIENTATION: LOWER;MID

## 2020-08-19 ASSESSMENT — PAIN DESCRIPTION - PROGRESSION
CLINICAL_PROGRESSION: GRADUALLY WORSENING
CLINICAL_PROGRESSION: GRADUALLY WORSENING
CLINICAL_PROGRESSION: GRADUALLY IMPROVING

## 2020-08-19 ASSESSMENT — PAIN DESCRIPTION - LOCATION
LOCATION: ABDOMEN

## 2020-08-19 NOTE — PLAN OF CARE
Problem: Pain:  Goal: Pain level will decrease  Description: Pain level will decrease  8/19/2020 1045 by Hair Goddard RN  Outcome: Ongoing  8/19/2020 0757 by Abimael Reyes RN  Outcome: Ongoing  Goal: Control of acute pain  Description: Control of acute pain  8/19/2020 1045 by Hair Goddard RN  Outcome: Ongoing  8/19/2020 0757 by Abimael Reyes RN  Outcome: Ongoing  Goal: Control of chronic pain  Description: Control of chronic pain  8/19/2020 1045 by Hair Goddard RN  Outcome: Ongoing  8/19/2020 0757 by Abimael Reyes RN  Outcome: Ongoing  Goal: Patient's pain/discomfort is manageable  Description: Patient's pain/discomfort is manageable  Outcome: Ongoing     Problem: Falls - Risk of:  Goal: Will remain free from falls  Description: Will remain free from falls  8/19/2020 1045 by Hair Goddard RN  Outcome: Ongoing  8/19/2020 0757 by Abimael Reyes RN  Outcome: Ongoing  Goal: Absence of physical injury  Description: Absence of physical injury  8/19/2020 1045 by Hair Goddard RN  Outcome: Ongoing  8/19/2020 0757 by Abimael Reyes RN  Outcome: Ongoing     Problem: Infection:  Goal: Will remain free from infection  Description: Will remain free from infection  Outcome: Ongoing     Problem: Safety:  Goal: Free from accidental physical injury  Description: Free from accidental physical injury  Outcome: Ongoing  Goal: Free from intentional harm  Description: Free from intentional harm  Outcome: Ongoing     Problem: Daily Care:  Goal: Daily care needs are met  Description: Daily care needs are met  Outcome: Ongoing     Problem: Skin Integrity:  Goal: Skin integrity will stabilize  Description: Skin integrity will stabilize  Outcome: Ongoing     Problem: Discharge Planning:  Goal: Patients continuum of care needs are met  Description: Patients continuum of care needs are met  Outcome: Ongoing     Problem: Discharge Planning:  Goal: Discharged to appropriate level of care  Description: Discharged to appropriate level of care  Outcome: Ongoing     Problem: Serum Glucose Level - Abnormal:  Goal: Ability to maintain appropriate glucose levels will improve  Description: Ability to maintain appropriate glucose levels will improve  Outcome: Ongoing     Problem: Sensory Perception - Impaired:  Goal: Ability to maintain a stable neurologic state will improve  Description: Ability to maintain a stable neurologic state will improve  Outcome: Ongoing

## 2020-08-19 NOTE — CONSULTS
Hospital Medicine  Consult History & Physical        Chief Complaint: Abdominal pain    Date of Service: Pt seen/examined in consultation on 8/19/20    History Of Present Illness:      63 y.o. male who we are asked to see/evaluate by Marta West, * for medical management of diabetes mellitus type 2. Patient presented from home with abdominal pain ongoing for the past few days. Pain is rated 9 out of 10, sharp and constant in the left lower quadrant. Nothing seems make the symptoms any better but movement does make the symptoms worse. CT in the emergency department showed sigmoid diverticulitis with concern for microperforation. General surgery was consulted for management the patient was started on IV antibiotics along with his home medications. In the outpatient patient has been titrating his Lantus dosing according to his primary care provider. His A1c on July 10 was 10.5. His A1c on admission is 8.9. This appears to be working quite well. He was placed on Lantus and sliding scale here in the hospital and his sugars have ranged from 205 to 155. Past Medical History:        Diagnosis Date    Diabetes mellitus ([address]     no meds    Diverticulitis large intestine 08/19/2020       Past Surgical History:        Procedure Laterality Date    COLONOSCOPY  02/19/2019    [place]    COLONOSCOPY N/A 2/19/2019    COLONOSCOPY DIAGNOSTIC performed by Anu Crystal MD at [address] ([place] [address])         Medications Prior to Admission:    Prior to Admission medications    Medication Sig Start Date End Date Taking?  Authorizing Provider   sildenafil (VIAGRA) 50 MG tablet Take 1 tablet by mouth daily as needed for Erectile Dysfunction 7/10/20  Yes Ania Pod, APRN - CNP   atorvastatin (LIPITOR) 40 MG tablet Take 1 tablet by mouth daily 6/18/20  Yes Ania Pod, APRN - CNP   meloxicam (MOBIC) 15 MG tablet TK 1 T PO QD 5/13/20  Yes Historical Provider, MD   insulin glargine (LANTUS SOLOSTAR) 100 UNIT/ML injection pen Inject 29 Units into the skin nightly 8/18/20   Miranda Noyola, DO   metFORMIN (GLUCOPHAGE XR) 500 MG extended release tablet Take 1 tablet by mouth nightly 8/18/20   Miranda Noyola, DO   Alcohol Swabs (ALCOHOL PREP) PADS 1 each by Does not apply route 3 times daily 8/18/20   Miranda Noyola, DO   blood glucose monitor strips Test 3 times a day & as needed for symptoms of irregular blood glucose. Dispense sufficient amount for indicated testing frequency plus additional to accommodate PRN testing needs. 8/18/20   Miranda Noyola, DO   glucose monitoring kit (FREESTYLE) monitoring kit 1 kit by Does not apply route daily 6/18/20   TABBY Ho CNP   Insulin Pen Needle (PEN NEEDLES 3/16\") 31G X 5 MM MISC 1 each by Does not apply route daily 6/18/20   TABBY Ho CNP   Lancets MISC 100 each by Does not apply route 3 times daily 6/18/20   TABBY Ho CNP       Allergies:  Sulfa antibiotics    Social History:      The patient currently lives at home    TOBACCO:   reports that he has never smoked. He has never used smokeless tobacco.  ETOH:   reports current alcohol use. Family History:     Reviewed in detail and negative for DM, CAD, Cancer, CVA. Positive as follows:        Problem Relation Age of Onset    Breast Cancer Mother     Diabetes Mother     Thyroid Disease Mother     Diabetes Paternal Grandmother     Asthma Brother     ADHD Son        REVIEW OF SYSTEMS:   Pertinent positives as noted in the HPI. All other systems reviewed and negative. PHYSICAL EXAM PERFORMED:  /85   Pulse 87   Temp 97.9 °F (36.6 °C) (Oral)   Resp 19   Ht 5' 8\" (1.727 m)   Wt 194 lb 0.1 oz (88 kg)   SpO2 97%   BMI 29.50 kg/m²   General appearance: No apparent distress, appears stated age and cooperative. HEENT: Normal cephalic, atraumatic without obvious deformity. Pupils equal, round, and reactive to light. Extra ocular muscles intact.  Conjunctivae/corneas clear.  Neck: Supple, with full range of motion. No jugular venous distention. Trachea midline. Respiratory:  Normal respiratory effort. Clear to auscultation, bilaterally without Rales/Wheezes/Rhonchi. Cardiovascular: Regular rate and rhythm with normal S1/S2 without murmurs, rubs or gallops. Abdomen: Soft, non-tender, non-distended with normal bowel sounds. Musculoskeletal: No clubbing, cyanosis or edema bilaterally. Skin: Skin color, texture, turgor normal.  No rashes or lesions. Neurologic:  Neurovascularly intact without any focal sensory/motor deficits. Cranial nerves: II-XII intact, grossly non-focal.  Psychiatric: Alert and oriented, thought content appropriate, normal insight  Capillary Refill: Brisk,< 3 seconds   Peripheral Pulses: +2 palpable, equal bilaterally     Labs:     Recent Labs     08/19/20 0130 08/19/20  0707   WBC 18.4* 16.9*   HGB 15.3 15.5   HCT 45.2 44.7    216     Recent Labs     08/19/20 0130 08/19/20  0707   * 135*   K 4.5 4.0   CL 98* 99   CO2 22 25   BUN 13 12   CREATININE 0.6* 0.7*   CALCIUM 10.2 9.0     Recent Labs     08/19/20  0130 08/19/20  0707   AST 17 13*   ALT 17 15   BILITOT 1.8* 1.9*   ALKPHOS 46 44     No results for input(s): INR in the last 72 hours. No results for input(s): Serene Butts in the last 72 hours. Urinalysis:    Lab Results   Component Value Date    NITRU Negative 08/19/2020    BLOODU Negative 08/19/2020    SPECGRAV 1.006 08/19/2020    GLUCOSEU 100 08/19/2020       Radiology: I have reviewed the radiology reports with the following interpretation:     CT ABDOMEN PELVIS W IV CONTRAST Additional Contrast? None   Final Result   Acute sigmoid diverticulitis, possibly with a contained perforation (see   annotated images). No drainable fluid collection or rim enhancing abscess.                 EKG:  I have reviewed the EKG with the following interpretation:    Normal sinus rhythm    ASSESSMENT:  PLAN:    Sepsis, poa  Heart rate, white blood cell count, source sigmoid diverticulitis  Continue IV fluids  Lactic acid normal  Continue IV antibiotics  Blood cultures ordered    Sigmoid diverticulitis with possible microperforation  Management per general surgery  IV antibiotics    Diabetes mellitus type 2, uncontrolled  Continue Lantus and sliding scale  Carb controlled diet    Bladder Incontinence  likely some element of poorly controlled sugars and BPH  Restart home flomax    DVT Prophylaxis: lovenox  Diet: DIET CLEAR LIQUID; Carb Control: 4 carb choices (60 gms)/meal  Code Status: Full Code    PT/OT Eval Status: Active and ongoing    Dispo - Inpt    Thank you for the consultation, will follow up as needed    Electronically signed by Haritha Ramos MD on 8/19/20 at 1:48 PM EDT

## 2020-08-19 NOTE — ED NOTES
Bed: [facility]  Expected date:   Expected time:   Means of arrival:   Comments:  Patient in [address] [place] [address]  08/19/20 0753

## 2020-08-19 NOTE — CARE COORDINATION
41 Units Subcutaneous Nightly    insulin lispro  5 Units Subcutaneous TID WC    tamsulosin  0.4 mg Oral Daily       Objective        Patient Active Problem List   Diagnosis Code    Chronic nonalcoholic liver disease U16.4    Gastroesophageal reflux disease without esophagitis K21.9    Type 2 diabetes mellitus without complication, without long-term current use of insulin (HCC) E11.9    Attention deficit disorder F98.8    Obesity (BMI 30-39. 9) E66.9    Rectal bleeding K62.5    Mixed hyperlipidemia E78.2    Benign prostatic hyperplasia with lower urinary tract symptoms N40.1    Sigmoid diverticulitis K57.32    Acute diverticulitis K57.92        BP (!) 141/79   Pulse 79   Temp 98.3 °F (36.8 °C) (Oral)   Resp 20   Ht 5' 8\" (1.727 m)   Wt 194 lb 0.1 oz (88 kg)   SpO2 98%   BMI 29.50 kg/m²     HgBA1c:    Lab Results   Component Value Date    LABA1C 8.9 08/19/2020       Recent Labs     08/19/20  0749 08/19/20  1146   POCGLU 205* 155*       BUN/Creatinine:    Lab Results   Component Value Date    BUN 12 08/19/2020    CREATININE 0.7 08/19/2020       Assessment        Diabetes Management and Education    Does the patient have a Primary Care Physician? Yes     Does the patient require new medication instruction? TBD - possible dose adjustments. He reports that he has active prescriptions for his Metformin and Lantus. He uses [facility] to lower prescription costs. Does the patient/caregiver monitor Blood Glucoses? Yes  Reviewed glycemic control targets, testing frequency and when to call PCP. Level of patient/caregiver understanding able to:        [x] Verbalized Understanding   [] Demonstrated Understanding       [] Teach Back       [] Needs Reinforcement     []  Other:      Does the patient/caregiver follow a Meal Plan? No: He is eating less bread, drinking less milk and trying to add more vegetables. Does the patient/caregiver understand S/S of Hypoglycemia?   No:   Reviewed symptoms, prevention and treatment. Level of patient/caregiver understanding able to:       [x] Verbalized Understanding   [] Demonstrated Understanding       [] Teach Back       [] Needs Reinforcement     [x]  Other:  Agrees to notify staff of any episodes. Does the patient/caregiver understand S/S of Hyperglycemia? Yes    Reviewed symptoms, prevention and treatment. Level of patient/caregiver understanding able to:        [x] Verbalized Understanding   [] Demonstrated Understanding       [] Teach Back       [] Needs Reinforcement     []  Other:           Plan        Ongoing diabetes education and blood glucose monitoring.                                              Discharge Plan:  Discharge needs: no prescription needs identified       Teaching Time Diabetes Education:  20 minutes     Electronically signed by Destinee Macdonald on 8/19/2020 at 2:48 PM

## 2020-08-19 NOTE — H&P
[address] and V[facility]  [phone]        Surgery History and Physical      Pt Name: Natty Domínguez  MRN: [id number]  YOB: 1965  Date of evaluation: 8/19/2020  Primary Care Physician: No primary care provider on file. Chief Complaint   Patient presents with    Abdominal Pain     intermittent abdominal pain for 2 years, states since Sunday he feels like there is a steel ball rattling around inside his stomach. last BM x2 days, mucous to BM's x5 days         Assessment/Plan   Acute sigmoid diverticulitis with possible microperforation  -continue IV antibiotics. -WBC improving with antibiotics  -No drainable fluid collections on CT  -Start clear liquids    DM  -continue tight control  -Will ask hospitalist to assist with management  -f/u with PCP at [address] [place] [address], [place]  Patient educated about the following as pertinent to medical/surgical problems: Disease Process, Medications, Smoking Cessation, Oxygenation, Incentive Spirometry and Deep Breath and Cough, Diabetes, Hyperlipidemia, Smoking Cessation, Nutrition, Exercise and Hypertension    SUBJECTIVE:   History of Chief Complaint:    Natty Domínguez is a 63 y.o. male [facility] hernia repair Dr. Piper Catherine 2010, DM who presents with suprapubic and periumbilical abdominal pain. Pain has been present off and on for years, last episode approximately two years ago, but this episode has been present for two weeks. Constant, feels like a ball of pressure in the \"taint\" area. Pain improved over the last 12 hours with fluids and antibiotics. Follows with MELBA Geller CNP for his diabetes, on lantus. Denies melena, hematochezia. Small amount of bright red blood with BMs sometimes. Having lots of pelvic pressure. Irregular bowel movements, mucous and constipation   2/19/2019 colonoscopy internal hemorrhoids, anal fissure. WBC 18.4-->16.9. CT acute sigmoid diverticulitis with pneumatosis and surrounding fat stranding.        Past Medical History   has a past medical history of Diabetes mellitus ([facility] [address] Past Surgical History   has a past surgical history that includes hernia repair; Colonoscopy (02/19/2019); and Colonoscopy (N/A, 2/19/2019). Medications  Prior to Admission medications    Medication Sig Start Date End Date Taking? Authorizing Provider   sildenafil (VIAGRA) 50 MG tablet Take 1 tablet by mouth daily as needed for Erectile Dysfunction 7/10/20  Yes TABBY Hahn CNP   atorvastatin (LIPITOR) 40 MG tablet Take 1 tablet by mouth daily 6/18/20  Yes TABBY Hahn CNP   meloxicam (MOBIC) 15 MG tablet TK 1 T PO QD 5/13/20  Yes Historical Provider, MD   insulin glargine (LANTUS SOLOSTAR) 100 UNIT/ML injection pen Inject 29 Units into the skin nightly 8/18/20   Miranda Noyola,    metFORMIN (GLUCOPHAGE XR) 500 MG extended release tablet Take 1 tablet by mouth nightly 8/18/20   Miranda Noyola, DO   Alcohol Swabs (ALCOHOL PREP) PADS 1 each by Does not apply route 3 times daily 8/18/20   Miranda Noyola, DO   blood glucose monitor strips Test 3 times a day & as needed for symptoms of irregular blood glucose. Dispense sufficient amount for indicated testing frequency plus additional to accommodate PRN testing needs.  8/18/20   Miranda Noyola DO   glucose monitoring kit (FREESTYLE) monitoring kit 1 kit by Does not apply route daily 6/18/20   TABBY Hahn CNP   Insulin Pen Needle (PEN NEEDLES 3/16\") 31G X 5 MM MISC 1 each by Does not apply route daily 6/18/20   TABBY Hahn CNP   Lancets MISC 100 each by Does not apply route 3 times daily 6/18/20   TABBY Hahn CNP    Scheduled Meds:   sodium chloride  1,000 mL Intravenous Once    morphine  4 mg Intravenous Once    sodium chloride flush  10 mL Intravenous 2 times per day    enoxaparin  40 mg Subcutaneous Daily    metroNIDAZOLE  500 mg Intravenous Q8H    ciprofloxacin  400 mg Intravenous Q12H    famotidine (PEPCID) injection  20 mg Intravenous BID    insulin glargine  0.25 Units/kg Subcutaneous Nightly    insulin lispro  0.08 Units/kg Subcutaneous TID     insulin lispro  0-12 Units Subcutaneous TID WC    insulin lispro  0-6 Units Subcutaneous Nightly    insulin lispro  0-12 Units Subcutaneous Q4H     Continuous Infusions:   dextrose      sodium chloride 125 mL/hr at 08/19/20 0701     PRN Meds:.sodium chloride flush, glucose, dextrose, glucagon (rDNA), dextrose, magnesium sulfate, potassium chloride, acetaminophen, oxyCODONE **OR** oxyCODONE, HYDROmorphone **OR** HYDROmorphone, ondansetron    Allergies  is allergic to sulfa antibiotics. Family History  Reviewed, non contribtory  family history includes ADHD in his son; Asthma in his brother; Breast Cancer in his mother; Diabetes in his mother and paternal grandmother; Thyroid Disease in his mother. Social History  Reviewed, non contributory   reports that he has never smoked. He has never used smokeless tobacco. He reports current alcohol use. He reports that he does not use drugs. Review of Systems:  General Denies any fever or chills  HEENT Denies any diplopia, tinnitus or vertigo  Resp Denies any shortness of breath, cough or wheezing  Cardiac Denies any chest pain, palpitations, claudication or edema  GI Denies any melena, hematochezia, hematemesis or pyrosis   Denies any frequency, urgency, hesitancy or incontinence  Heme Denies bruising or bleeding easily  Endocrine Denies any history of diabetes or thyroid disease  Neuro Denies any focal motor or sensory deficits    OBJECTIVE:   VITALS:  height is 5' 8\" (1.727 m) and weight is 194 lb 0.1 oz (88 kg). His oral temperature is 97.9 °F (36.6 °C). His blood pressure is 138/85 and his pulse is 87. His respiration is 19 and oxygen saturation is 97%. CONSTITUTIONAL: Alert and oriented times 3, no acute distress and cooperative to examination with proper mood and affect. SKIN: Skin color, texture, turgor normal. No rashes or lesions.   LYMPH: no cervical nodes, no inguinal nodes  HEENT: Head is normocephalic, atraumatic. EOMI, PERRLA. NECK: Supple, symmetrical, trachea midline, no adenopathy, thyroid symmetric, not enlarged and no tenderness, skin normal.  CHEST/LUNGS: chest symmetric with normal A/P diameter, normal respiratory rate and rhythm, lungs clear to auscultation without wheezes, rales or rhonchi. No accessory muscle use. CARDIOVASCULAR: Heart sounds are normal.  Regular rate and rhythm without murmur, gallop or rub. Carotid and femoral pulses 2+/4 and equal bilaterally. ABDOMEN: Soft, suprapubic and infraumbilical moderate abdominal pain, not peritoneal.   RECTAL: deferred, not clinically indicated  NEUROLOGIC: There are no focalizing motor or sensory deficits. CN II-XII are grossly intact. Chandra Wood EXTREMITIES: no cyanosis, no clubbing and no edema. LABS:     Recent Labs     08/19/20  0100 08/19/20  0130 08/19/20  0707   WBC  --  18.4* 16.9*   HGB  --  15.3 15.5   HCT  --  45.2 44.7   PLT  --  242 216   NA  --  134* 135*   K  --  4.5 4.0   CL  --  98* 99   CO2  --  22 25   BUN  --  13 12   CREATININE  --  0.6* 0.7*   MG  --  2.10  --    CALCIUM  --  10.2 9.0   AST  --  17 13*   ALT  --  17 15   BILITOT  --  1.8* 1.9*   NITRU Negative  --   --    COLORU YELLOW  --   --      Recent Labs     08/19/20  0707   ALKPHOS 44   ALT 15   AST 13*   BILITOT 1.9*   LABALBU 4.2   LIPASE 21.0         RADIOLOGY:   I have personally reviewed the following films:  CT ABDOMEN PELVIS W IV CONTRAST Additional Contrast? None   Final Result   Acute sigmoid diverticulitis, possibly with a contained perforation (see   annotated images). No drainable fluid collection or rim enhancing abscess. Thank you for the interesting evaluation. Further recommendations to follow. Electronically signed by TABBY Dooley CNP on 8/19/2020 at 11:49 AM    Agree with above note. The patient was personally seen and examined.   Helen Tobar is a 62 yo male with hx of MD who presents with a three day history of worsening abdominal pain. Pain was located in the LLQ and radiated to the pelvis. He felt \"like he was sitting on a tennis ball\" with the pressure. He never had anything like this before. Denies nausea or emesis, but does have a loss of appetite and decrease bowel function. Had a colonoscopy 1 year ago with Dr. Rand Galan. Hx of open bilateral inguinal hernia repair with mesh.     NAD  Normal respiratory effort, no accessory muscle use  Abd soft, minimally distended, moderate LLQ tenderness without peritonitis    WBC 16.9 (18.4)  Cr 0.7    CT abd/pelvis personally reviewed, showing acute sigmoid diverticulitis with possible contained perforation    A/P: 62 yo male with DM, sigmoid diverticulitis    Feeling better  Advance to clears  IV cipro/flagyl  DM - will consult hospitalist, appreciate assistance

## 2020-08-19 NOTE — PROGRESS NOTES
Admitted 63 y.o male to room 4118 from the emergency room. A&O. Oriented to room, call light, TV, and phone. Patient able to use call light to express needs. Bed to lowest position with door open and call light in reach. All fall precautions implemented at this time. Will continue to monitor.

## 2020-08-19 NOTE — ED PROVIDER NOTES
[facility]      Pt Name: Radha Leon  MRN: [id number]  [place] 1965  Date of evaluation: 8/18/2020  Provider: Orlando Kim MD    CHIEF COMPLAINT       Chief Complaint   Patient presents with    Abdominal Pain     HISTORY OF PRESENT ILLNESS    Radha Leon is a 63 y.o. male who presents to the emergency department with abdominal pain. Intermittent abdominal pain for 2 years. Worsening last few days. Pain is acute on chronic 9/10 sharp and constant in nature in LLQ. Nothing makes symptoms better but movement makes symptoms worse. This has never happened before. No other associated symptoms other than previously mentioned. Nursing Notes were reviewed. Including nursing noted for FM, Surgical History, Past Medical History, Social History, vitals, and allergies; agree with all. REVIEW OF SYSTEMS       Review of Systems   Constitutional: Negative for activity change, appetite change, chills, diaphoresis, fatigue, fever and unexpected weight change. HENT: Negative for congestion, dental problem, drooling, ear discharge and ear pain. Eyes: Negative for photophobia, pain, discharge, redness, itching and visual disturbance. Respiratory: Negative for apnea, cough, choking, chest tightness, shortness of breath, wheezing and stridor. Cardiovascular: Negative for chest pain, palpitations and leg swelling. Gastrointestinal: Positive for abdominal pain, constipation, nausea and vomiting. Negative for abdominal distention, anal bleeding, blood in stool, diarrhea and rectal pain. Endocrine: Negative for cold intolerance and heat intolerance. Genitourinary: Negative for decreased urine volume and urgency. Musculoskeletal: Negative for arthralgias and back pain. Skin: Negative for color change and pallor. Neurological: Negative for dizziness and facial asymmetry. Hematological: Negative for adenopathy.  Does not bruise/bleed easily. Psychiatric/Behavioral: Negative for agitation, behavioral problems, confusion and decreased concentration. Except as noted above the remainder of the review of systems was reviewed and negative. PAST MEDICAL HISTORY     Past Medical History:   Diagnosis Date    Diabetes mellitus ([address]     no meds       SURGICAL HISTORY       Past Surgical History:   Procedure Laterality Date    COLONOSCOPY  02/19/2019    [place]    COLONOSCOPY N/A 2/19/2019    COLONOSCOPY DIAGNOSTIC performed by Ajith Leija MD at [address] [place] [address]       Previous Medications    ALCOHOL SWABS (ALCOHOL PREP) PADS    1 each by Does not apply route 3 times daily    ATORVASTATIN (LIPITOR) 40 MG TABLET    Take 1 tablet by mouth daily    BLOOD GLUCOSE MONITOR STRIPS    Test 3 times a day & as needed for symptoms of irregular blood glucose. Dispense sufficient amount for indicated testing frequency plus additional to accommodate PRN testing needs.     GLUCOSE MONITORING KIT (FREESTYLE) MONITORING KIT    1 kit by Does not apply route daily    INSULIN GLARGINE (LANTUS SOLOSTAR) 100 UNIT/ML INJECTION PEN    Inject 29 Units into the skin nightly    INSULIN PEN NEEDLE (PEN NEEDLES 3/16\") 31G X 5 MM MISC    1 each by Does not apply route daily    LANCETS MISC    100 each by Does not apply route 3 times daily    MELOXICAM (MOBIC) 15 MG TABLET    TK 1 T PO QD    METFORMIN (GLUCOPHAGE XR) 500 MG EXTENDED RELEASE TABLET    Take 1 tablet by mouth nightly    SILDENAFIL (VIAGRA) 50 MG TABLET    Take 1 tablet by mouth daily as needed for Erectile Dysfunction       ALLERGIES     Sulfa antibiotics    FAMILY HISTORY        Family History   Problem Relation Age of Onset    Breast Cancer Mother     Diabetes Mother     Thyroid Disease Mother     Diabetes Paternal Grandmother     Asthma Brother     ADHD Son        SOCIAL HISTORY       Social History     Socioeconomic History    Marital status:  Heart sounds: No murmur. Pulmonary:      Breath sounds: No wheezing or rales. Chest:      Chest wall: No tenderness. Abdominal:      General: There is no distension. Palpations: Abdomen is soft. There is no mass. Tenderness: There is abdominal tenderness. There is no guarding or rebound. Musculoskeletal: Normal range of motion. General: No tenderness or deformity. Skin:     General: Skin is warm. Coloration: Skin is not pale. Findings: No erythema or rash. Neurological:      Mental Status: He is alert. Cranial Nerves: No cranial nerve deficit. Motor: No abnormal muscle tone.       Coordination: Coordination normal.         DIAGNOSTIC RESULTS     EKG: All EKG's are interpreted by the Emergency Department Physician who either signs or Co-signs this chart in the absence of acardiologist.    None    RADIOLOGY:   Non-plain film images such as CT, Ultrasoundand MRI are read by the radiologist. Plain radiographic images are visualized and preliminarily interpreted by the emergency physician with the below findings:    Impression    Acute sigmoid diverticulitis, possibly with a contained perforation (see    annotated images).  No drainable fluid collection or rim enhancing abscess.           ED BEDSIDE ULTRASOUND:   Performed by ED Physician - none    LABS:  Labs Reviewed   CBC WITH AUTO DIFFERENTIAL - Abnormal; Notable for the following components:       Result Value    WBC 18.4 (*)     Neutrophils Absolute 14.8 (*)     All other components within normal limits    Narrative:     Performed at:  [address]   Phone [phone]   COMPREHENSIVE METABOLIC PANEL - Abnormal; Notable for the following components:    Sodium 134 (*)     Chloride 98 (*)     Glucose 191 (*)     CREATININE 0.6 (*)     Total Bilirubin 1.8 (*)     All other components within normal limits    Narrative:     Performed at:  [facility] ofthis note were completed with a voice recognition program.  Efforts were made to edit the dictations but occasionally words are mis-transcribed.)    Chaka Brewer MD(electronically signed)  Attending Emergency Physician        Chaka Brewer MD  08/19/20 4748

## 2020-08-20 LAB
ANION GAP SERPL CALCULATED.3IONS-SCNC: 11 MMOL/L (ref 3–16)
BASOPHILS ABSOLUTE: 0.1 K/UL (ref 0–0.2)
BASOPHILS RELATIVE PERCENT: 0.9 %
BUN BLDV-MCNC: 9 MG/DL (ref 7–20)
CALCIUM SERPL-MCNC: 8.2 MG/DL (ref 8.3–10.6)
CHLORIDE BLD-SCNC: 104 MMOL/L (ref 99–110)
CO2: 23 MMOL/L (ref 21–32)
CREAT SERPL-MCNC: 0.7 MG/DL (ref 0.9–1.3)
EOSINOPHILS ABSOLUTE: 0.2 K/UL (ref 0–0.6)
EOSINOPHILS RELATIVE PERCENT: 2.1 %
GFR AFRICAN AMERICAN: >60
GFR NON-AFRICAN AMERICAN: >60
GLUCOSE BLD-MCNC: 106 MG/DL (ref 70–99)
GLUCOSE BLD-MCNC: 110 MG/DL (ref 70–99)
GLUCOSE BLD-MCNC: 112 MG/DL (ref 70–99)
GLUCOSE BLD-MCNC: 149 MG/DL (ref 70–99)
GLUCOSE BLD-MCNC: 150 MG/DL (ref 70–99)
GLUCOSE BLD-MCNC: 80 MG/DL (ref 70–99)
HCT VFR BLD CALC: 39.5 % (ref 40.5–52.5)
HEMOGLOBIN: 13.7 G/DL (ref 13.5–17.5)
LYMPHOCYTES ABSOLUTE: 1.6 K/UL (ref 1–5.1)
LYMPHOCYTES RELATIVE PERCENT: 16.9 %
MAGNESIUM: 2 MG/DL (ref 1.8–2.4)
MCH RBC QN AUTO: 29.7 PG (ref 26–34)
MCHC RBC AUTO-ENTMCNC: 34.7 G/DL (ref 31–36)
MCV RBC AUTO: 85.6 FL (ref 80–100)
MONOCYTES ABSOLUTE: 0.7 K/UL (ref 0–1.3)
MONOCYTES RELATIVE PERCENT: 7.2 %
NEUTROPHILS ABSOLUTE: 7.1 K/UL (ref 1.7–7.7)
NEUTROPHILS RELATIVE PERCENT: 72.9 %
PDW BLD-RTO: 13.2 % (ref 12.4–15.4)
PERFORMED ON: ABNORMAL
PERFORMED ON: NORMAL
PHOSPHORUS: 3 MG/DL (ref 2.5–4.9)
PLATELET # BLD: 196 K/UL (ref 135–450)
PMV BLD AUTO: 8.3 FL (ref 5–10.5)
POTASSIUM REFLEX MAGNESIUM: 3.8 MMOL/L (ref 3.5–5.1)
RBC # BLD: 4.62 M/UL (ref 4.2–5.9)
SODIUM BLD-SCNC: 138 MMOL/L (ref 136–145)
WBC # BLD: 9.7 K/UL (ref 4–11)

## 2020-08-20 PROCEDURE — 6370000000 HC RX 637 (ALT 250 FOR IP): Performed by: SURGERY

## 2020-08-20 PROCEDURE — 85025 COMPLETE CBC W/AUTO DIFF WBC: CPT

## 2020-08-20 PROCEDURE — 84100 ASSAY OF PHOSPHORUS: CPT

## 2020-08-20 PROCEDURE — 2580000003 HC RX 258: Performed by: SURGERY

## 2020-08-20 PROCEDURE — 94760 N-INVAS EAR/PLS OXIMETRY 1: CPT

## 2020-08-20 PROCEDURE — 2500000003 HC RX 250 WO HCPCS: Performed by: SURGERY

## 2020-08-20 PROCEDURE — 6370000000 HC RX 637 (ALT 250 FOR IP): Performed by: INTERNAL MEDICINE

## 2020-08-20 PROCEDURE — APPNB15 APP NON BILLABLE TIME 0-15 MINS: Performed by: NURSE PRACTITIONER

## 2020-08-20 PROCEDURE — 36415 COLL VENOUS BLD VENIPUNCTURE: CPT

## 2020-08-20 PROCEDURE — 80048 BASIC METABOLIC PNL TOTAL CA: CPT

## 2020-08-20 PROCEDURE — 83735 ASSAY OF MAGNESIUM: CPT

## 2020-08-20 PROCEDURE — 99232 SBSQ HOSP IP/OBS MODERATE 35: CPT | Performed by: SURGERY

## 2020-08-20 PROCEDURE — APPSS15 APP SPLIT SHARED TIME 0-15 MINUTES: Performed by: NURSE PRACTITIONER

## 2020-08-20 PROCEDURE — 6360000002 HC RX W HCPCS: Performed by: SURGERY

## 2020-08-20 PROCEDURE — 1200000000 HC SEMI PRIVATE

## 2020-08-20 RX ORDER — DOCUSATE SODIUM 100 MG/1
100 CAPSULE, LIQUID FILLED ORAL 2 TIMES DAILY
Status: DISCONTINUED | OUTPATIENT
Start: 2020-08-20 | End: 2020-08-21 | Stop reason: HOSPADM

## 2020-08-20 RX ADMIN — SODIUM CHLORIDE: 9 INJECTION, SOLUTION INTRAVENOUS at 05:05

## 2020-08-20 RX ADMIN — CIPROFLOXACIN 400 MG: 2 INJECTION, SOLUTION INTRAVENOUS at 01:00

## 2020-08-20 RX ADMIN — INSULIN LISPRO 5 UNITS: 100 INJECTION, SOLUTION INTRAVENOUS; SUBCUTANEOUS at 08:45

## 2020-08-20 RX ADMIN — FAMOTIDINE 20 MG: 10 INJECTION, SOLUTION INTRAVENOUS at 20:41

## 2020-08-20 RX ADMIN — CIPROFLOXACIN 400 MG: 2 INJECTION, SOLUTION INTRAVENOUS at 13:54

## 2020-08-20 RX ADMIN — METRONIDAZOLE 500 MG: 500 INJECTION, SOLUTION INTRAVENOUS at 12:54

## 2020-08-20 RX ADMIN — INSULIN LISPRO 2 UNITS: 100 INJECTION, SOLUTION INTRAVENOUS; SUBCUTANEOUS at 17:05

## 2020-08-20 RX ADMIN — INSULIN GLARGINE 41 UNITS: 100 INJECTION, SOLUTION SUBCUTANEOUS at 20:41

## 2020-08-20 RX ADMIN — DOCUSATE SODIUM 100 MG: 100 CAPSULE, LIQUID FILLED ORAL at 14:08

## 2020-08-20 RX ADMIN — SODIUM CHLORIDE, PRESERVATIVE FREE 10 ML: 5 INJECTION INTRAVENOUS at 20:47

## 2020-08-20 RX ADMIN — FAMOTIDINE 20 MG: 10 INJECTION, SOLUTION INTRAVENOUS at 08:45

## 2020-08-20 RX ADMIN — INSULIN LISPRO 1 UNITS: 100 INJECTION, SOLUTION INTRAVENOUS; SUBCUTANEOUS at 20:41

## 2020-08-20 RX ADMIN — TAMSULOSIN HYDROCHLORIDE 0.4 MG: 0.4 CAPSULE ORAL at 08:45

## 2020-08-20 RX ADMIN — DOCUSATE SODIUM 100 MG: 100 CAPSULE, LIQUID FILLED ORAL at 20:47

## 2020-08-20 RX ADMIN — METRONIDAZOLE 500 MG: 500 INJECTION, SOLUTION INTRAVENOUS at 05:05

## 2020-08-20 RX ADMIN — ENOXAPARIN SODIUM 40 MG: 40 INJECTION SUBCUTANEOUS at 08:45

## 2020-08-20 RX ADMIN — ACETAMINOPHEN 650 MG: 325 TABLET ORAL at 05:32

## 2020-08-20 RX ADMIN — METRONIDAZOLE 500 MG: 500 INJECTION, SOLUTION INTRAVENOUS at 20:40

## 2020-08-20 ASSESSMENT — PAIN - FUNCTIONAL ASSESSMENT: PAIN_FUNCTIONAL_ASSESSMENT: ACTIVITIES ARE NOT PREVENTED

## 2020-08-20 ASSESSMENT — PAIN SCALES - GENERAL
PAINLEVEL_OUTOF10: 3
PAINLEVEL_OUTOF10: 5

## 2020-08-20 ASSESSMENT — PAIN DESCRIPTION - ORIENTATION
ORIENTATION: LOWER;MID
ORIENTATION: RIGHT;LEFT

## 2020-08-20 ASSESSMENT — PAIN DESCRIPTION - LOCATION
LOCATION: ABDOMEN
LOCATION: SHOULDER

## 2020-08-20 ASSESSMENT — PAIN DESCRIPTION - PAIN TYPE
TYPE: ACUTE PAIN
TYPE: ACUTE PAIN

## 2020-08-20 ASSESSMENT — PAIN DESCRIPTION - ONSET: ONSET: ON-GOING

## 2020-08-20 ASSESSMENT — PAIN DESCRIPTION - DESCRIPTORS
DESCRIPTORS: ACHING
DESCRIPTORS: ACHING;SORE

## 2020-08-20 ASSESSMENT — PAIN DESCRIPTION - FREQUENCY: FREQUENCY: INTERMITTENT

## 2020-08-20 ASSESSMENT — PAIN DESCRIPTION - PROGRESSION: CLINICAL_PROGRESSION: RAPIDLY IMPROVING

## 2020-08-20 NOTE — PROGRESS NOTES
FSBS discussed with MD Deborah Knight. N.O in place.  Electronically signed by Dane Chawla RN on 8/20/2020 at 12:44 PM

## 2020-08-20 NOTE — PLAN OF CARE
Problem: Pain:  Goal: Pain level will decrease  Description: Pain level will decrease  8/19/2020 2126 by Lien Philippe  Outcome: Ongoing  8/19/2020 1045 by Allyson Medeiros RN  Outcome: Ongoing  8/19/2020 0757 by Ekta Tapia RN  Outcome: Ongoing  Goal: Control of acute pain  Description: Control of acute pain  8/19/2020 2126 by Lien Philippe  Outcome: Ongoing  8/19/2020 1045 by Allyson Medeiros RN  Outcome: Ongoing  8/19/2020 0757 by Ekta Tapia RN  Outcome: Ongoing  Goal: Control of chronic pain  Description: Control of chronic pain  8/19/2020 2126 by Lien Philippe  Outcome: Ongoing  8/19/2020 1045 by Allyson Medeiros RN  Outcome: Ongoing  8/19/2020 0757 by Ekta Tapia RN  Outcome: Ongoing  Goal: Patient's pain/discomfort is manageable  Description: Patient's pain/discomfort is manageable  8/19/2020 2126 by Lien Philippe  Outcome: Ongoing  8/19/2020 1045 by Allyson Medeiros RN  Outcome: Ongoing     Problem: Falls - Risk of:  Goal: Will remain free from falls  Description: Will remain free from falls  8/19/2020 2126 by Lien Philippe  Outcome: Ongoing  8/19/2020 1045 by Allyson Medeiros RN  Outcome: Ongoing  8/19/2020 0757 by Ekta Tapia RN  Outcome: Ongoing  Goal: Absence of physical injury  Description: Absence of physical injury  8/19/2020 2126 by Lien Philippe  Outcome: Ongoing  8/19/2020 1045 by Allyson Medeiros RN  Outcome: Ongoing  8/19/2020 0757 by Ekta Tapia RN  Outcome: Ongoing     Problem: Infection:  Goal: Will remain free from infection  Description: Will remain free from infection  8/19/2020 2126 by Lien Philippe  Outcome: Ongoing  8/19/2020 1045 by Allyson Medeiros RN  Outcome: Ongoing     Problem: Safety:  Goal: Free from accidental physical injury  Description: Free from accidental physical injury  8/19/2020 2126 by Lien Philippe  Outcome: Ongoing  8/19/2020 1045 by Allyson Medeiros RN  Outcome: Ongoing  Goal: Free from intentional harm  Description: Free from intentional harm  8/19/2020 2126 by Lien Philippe  Outcome:

## 2020-08-20 NOTE — PLAN OF CARE
Problem: Pain:  Goal: Pain level will decrease  Description: Pain level will decrease  8/20/2020 0809 by Silvestre Quinn RN  Outcome: Ongoing  8/19/2020 2126 by Richard Agustin  Outcome: Ongoing  Goal: Control of acute pain  Description: Control of acute pain  8/20/2020 0809 by Silvestre Quinn RN  Outcome: Ongoing  8/19/2020 2126 by Richard Agustin  Outcome: Ongoing  Goal: Control of chronic pain  Description: Control of chronic pain  8/20/2020 0809 by Silvestre Quinn RN  Outcome: Ongoing  8/19/2020 2126 by Richard Agustin  Outcome: Ongoing  Goal: Patient's pain/discomfort is manageable  Description: Patient's pain/discomfort is manageable  8/20/2020 0809 by Silvestre Quinn RN  Outcome: Ongoing  8/19/2020 2126 by Richard Agustin  Outcome: Ongoing     Problem: Falls - Risk of:  Goal: Will remain free from falls  Description: Will remain free from falls  8/20/2020 0809 by Silvestre Quinn RN  Outcome: Ongoing  8/19/2020 2126 by Richard Agustin  Outcome: Ongoing  Goal: Absence of physical injury  Description: Absence of physical injury  8/20/2020 0809 by Silvestre Quinn RN  Outcome: Ongoing  8/19/2020 2126 by Richard Agustin  Outcome: Ongoing     Problem: Infection:  Goal: Will remain free from infection  Description: Will remain free from infection  8/20/2020 0809 by Silvestre Quinn RN  Outcome: Ongoing  8/19/2020 2126 by Richard Agustin  Outcome: Ongoing     Problem: Safety:  Goal: Free from accidental physical injury  Description: Free from accidental physical injury  8/20/2020 0809 by Silvestre Quinn RN  Outcome: Ongoing  8/19/2020 2126 by Richard Agustin  Outcome: Ongoing  Goal: Free from intentional harm  Description: Free from intentional harm  8/20/2020 0809 by Silvestre Quinn RN  Outcome: Ongoing  8/19/2020 2126 by Richard Agustin  Outcome: Ongoing     Problem: Daily Care:  Goal: Daily care needs are met  Description: Daily care needs are met  8/20/2020 0809 by Silvestre Quinn RN  Outcome: Ongoing  8/19/2020 2126 by Richard Agustin  Outcome: Ongoing     Problem: Skin Integrity:  Goal: Skin integrity will stabilize  Description: Skin integrity will stabilize  8/20/2020 0809 by Maryjane Blake RN  Outcome: Ongoing  8/19/2020 2126 by Allison Maddox  Outcome: Ongoing     Problem: Discharge Planning:  Goal: Patients continuum of care needs are met  Description: Patients continuum of care needs are met  8/20/2020 0809 by Maryjane Blake RN  Outcome: Ongoing  8/19/2020 2126 by Allison Maddox  Outcome: Ongoing     Problem: Discharge Planning:  Goal: Discharged to appropriate level of care  Description: Discharged to appropriate level of care  8/20/2020 0809 by Maryjane Blake RN  Outcome: Ongoing  8/19/2020 2126 by Allison Maddox  Outcome: Ongoing     Problem: Serum Glucose Level - Abnormal:  Goal: Ability to maintain appropriate glucose levels will improve  Description: Ability to maintain appropriate glucose levels will improve  8/20/2020 0809 by Maryjane Blake RN  Outcome: Ongoing  8/19/2020 2126 by Allison Maddox  Outcome: Ongoing     Problem: Serum Glucose Level - Abnormal:  Goal: Ability to maintain appropriate glucose levels will improve  Description: Ability to maintain appropriate glucose levels will improve  8/20/2020 0809 by Maryjane Blake RN  Outcome: Ongoing  8/19/2020 2126 by Allison Maddox  Outcome: Ongoing     Problem: Sensory Perception - Impaired:  Goal: Ability to maintain a stable neurologic state will improve  Description: Ability to maintain a stable neurologic state will improve  8/20/2020 0809 by Maryjane Blake RN  Outcome: Ongoing  8/19/2020 2126 by Allison Maddox  Outcome: Ongoing     Problem: Nutritional:  Goal: Nutritional status will improve  Description: Nutritional status will improve  Outcome: Ongoing     Problem: Physical Regulation:  Goal: Will remain free from infection  Description: Will remain free from infection  8/20/2020 0809 by Maryjane Blake RN  Outcome: Ongoing  8/19/2020 2126 by Allison Maddox  Outcome: Ongoing  Goal: Diagnostic test results will improve  Description: Diagnostic test results will improve  Outcome: Ongoing  Goal: Ability to maintain vital signs within normal range will improve  Description: Ability to maintain vital signs within normal range will improve  Outcome: Ongoing     Problem: Respiratory:  Goal: Ability to maintain normal respiratory secretions will improve  Description: Ability to maintain normal respiratory secretions will improve  Outcome: Ongoing     Problem: Skin Integrity:  Goal: Demonstration of wound healing without infection will improve  Description: Demonstration of wound healing without infection will improve  Outcome: Ongoing  Goal: Complications related to intravenous access or infusion will be avoided or minimized  Description: Complications related to intravenous access or infusion will be avoided or minimized  Outcome: Ongoing

## 2020-08-20 NOTE — PROGRESS NOTES
Hospitalist Progress Note      PCP: No primary care provider on file. Date of Admission: 8/19/2020    Subjective: feels pain controlled, BS on lower end, moving bowels, tolerating clears    Medications:  Reviewed    Infusion Medications    dextrose      sodium chloride 125 mL/hr at 08/20/20 0505     Scheduled Medications    docusate sodium  100 mg Oral BID    sodium chloride  1,000 mL Intravenous Once    morphine  4 mg Intravenous Once    sodium chloride flush  10 mL Intravenous 2 times per day    enoxaparin  40 mg Subcutaneous Daily    metroNIDAZOLE  500 mg Intravenous Q8H    ciprofloxacin  400 mg Intravenous Q12H    famotidine (PEPCID) injection  20 mg Intravenous BID    insulin lispro  0-12 Units Subcutaneous TID WC    insulin lispro  0-6 Units Subcutaneous Nightly    insulin glargine  41 Units Subcutaneous Nightly    tamsulosin  0.4 mg Oral Daily     PRN Meds: sodium chloride flush, glucose, dextrose, glucagon (rDNA), dextrose, magnesium sulfate, potassium chloride, acetaminophen, oxyCODONE **OR** oxyCODONE, HYDROmorphone **OR** HYDROmorphone, ondansetron      Intake/Output Summary (Last 24 hours) at 8/20/2020 1553  Last data filed at 8/20/2020 0818  Gross per 24 hour   Intake 1140 ml   Output --   Net 1140 ml       Physical Exam Performed:    /80   Pulse 77   Temp 97.7 °F (36.5 °C) (Oral)   Resp 17   Ht 5' 8\" (1.727 m)   Wt 195 lb 8.8 oz (88.7 kg)   SpO2 97%   BMI 29.73 kg/m²     General appearance: No apparent distress, appears stated age and cooperative. HEENT: Normal cephalic, atraumatic without obvious deformity. Pupils equal, round, and reactive to light. Extra ocular muscles intact. Conjunctivae/corneas clear. Neck: Supple, with full range of motion. No jugular venous distention. Trachea midline. Respiratory:  Normal respiratory effort. Clear to auscultation, bilaterally without Rales/Wheezes/Rhonchi.   Cardiovascular: Regular rate and rhythm with normal S1/S2 without murmurs, rubs or gallops. Abdomen: Soft, non-tender, non-distended with normal bowel sounds. Musculoskeletal: No clubbing, cyanosis or edema bilaterally. Skin: Skin color, texture, turgor normal.  No rashes or lesions. Neurologic:  Neurovascularly intact without any focal sensory/motor deficits. Cranial nerves: II-XII intact, grossly non-focal.  Psychiatric: Alert and oriented, thought content appropriate, normal insight  Capillary Refill: Brisk,< 3 seconds   Peripheral Pulses: +2 palpable, equal bilaterally     Labs:   Recent Labs     08/19/20  0130 08/19/20  0707 08/20/20  0427   WBC 18.4* 16.9* 9.7   HGB 15.3 15.5 13.7   HCT 45.2 44.7 39.5*    216 196     Recent Labs     08/19/20  0130 08/19/20  0707 08/20/20  0427   * 135* 138   K 4.5 4.0 3.8   CL 98* 99 104   CO2 22 25 23   BUN 13 12 9   CREATININE 0.6* 0.7* 0.7*   CALCIUM 10.2 9.0 8.2*   PHOS  --   --  3.0     Recent Labs     08/19/20  0130 08/19/20  0707   AST 17 13*   ALT 17 15   BILITOT 1.8* 1.9*   ALKPHOS 46 44     No results for input(s): INR in the last 72 hours. No results for input(s): Scar Hodgkins in the last 72 hours. Urinalysis:      Lab Results   Component Value Date    NITRU Negative 08/19/2020    BLOODU Negative 08/19/2020    SPECGRAV 1.006 08/19/2020    GLUCOSEU 100 08/19/2020       Radiology:  CT ABDOMEN PELVIS W IV CONTRAST Additional Contrast? None   Final Result   Acute sigmoid diverticulitis, possibly with a contained perforation (see   annotated images). No drainable fluid collection or rim enhancing abscess.                  Assessment/Plan:    Active Hospital Problems    Diagnosis    Sigmoid diverticulitis [K57.32]    Acute diverticulitis [K57.92]         Sepsis, poa - resolved  Heart rate, white blood cell count, source sigmoid diverticulitis  wean IV fluids  Lactic acid normal  Continue IV antibiotics  Blood cultures ordered     Sigmoid diverticulitis with possible microperforation  Management per general surgery  IV antibiotics, advance diet as tolerated     Diabetes mellitus type 2, uncontrolled  Continued Lantus and sliding scale  Carb controlled diet when starting diet  Decrease prandial insulin since BS 80s     Bladder Incontinence  likely some element of poorly controlled sugars and BPH  Restart home flomax       DVT Prophylaxis: lovenox  Diet: DIET LOW FIBER;  Carb Control: 4 carb choices (60 gms)/meal  Code Status: Full Code    PT/OT Eval Status: ordered    Pastor Reyes MD

## 2020-08-20 NOTE — PROGRESS NOTES
4 Eyes Skin Assessment     The patient is being assess for  Admission    I agree that 2 RN's have performed a thorough Head to Toe Skin Assessment on the patient. ALL assessment sites listed below have been assessed. Areas assessed by both nurses: yes  [x]   Head, Face, and Ears   [x]   Shoulders, Back, and Chest  [x]   Arms, Elbows, and Hands   [x]   Coccyx, Sacrum, and IschIum  [x]   Legs, Feet, and Heels        Does the Patient have Skin Breakdown?   No         Michi Prevention initiated:  NA   Wound Care Orders initiated:  NA      [facility] nurse consulted for Pressure Injury (Stage 3,4, Unstageable, DTI, NWPT, and Complex wounds), New and Established Ostomies:  NA      Nurse 1 eSignature: Electronically signed by Charmaine Holly RN on 8/20/20 at 7:13 AM EDT    **SHARE this note so that the co-signing nurse is able to place an eSignature**    Nurse 2 eSignature: Electronically signed by Luis Barron RN on 8/20/20 at 7:32 AM EDT

## 2020-08-20 NOTE — PROGRESS NOTES
[facility] [phone]                                     Daily Progress Note                                                         Pt Name: Flo Borjas  Medical Record Number: [id number]  Date of Birth 1965   Today's Date: 8/20/2020  CC: diverticulitis    ASSESSMENT/PLAN  Sigmoid diverticulitis  -Improving tenderness  -Bowels working  -Advance to low fiber diet  -WBC normalized. -Home tomorrow if tolerates diet. DM  -appreciate hospitalist assistance with management. Discharge Planning: DC tomorrow        SUBJECTIVE  Trixie Carbon has improved from yesterday. Pain is well controlled. He has no nausea and no vomiting. He has passed flatus and has not had a bowel movement. He is tolerating thin liquids. OBJECTIVE  VITALS:  height is 5' 8\" (1.727 m) and weight is 195 lb 8.8 oz (88.7 kg). His oral temperature is 97.7 °F (36.5 °C). His blood pressure is 115/73 and his pulse is 67. His respiration is 18 and oxygen saturation is 97%. INTAKE/OUTPUT:    Intake/Output Summary (Last 24 hours) at 8/20/2020 1313  Last data filed at 8/20/2020 0818  Gross per 24 hour   Intake 1140 ml   Output --   Net 1140 ml     GENERAL: alert, no distress  LUNGS: clear to ausculation, without wheezes, rales or rhonci  HEART: normal rate and regular rhythm  ABDOMEN: soft, improving suprapubic and LLQ tenderness, mild to moderate. Non distended. EXTREMITY: no cyanosis, clubbing or edema    I/O last 3 completed shifts:   In: 1020 [P.O.:1020]  Out: -       LABS  Recent Labs     08/19/20  0100  08/19/20  0707 08/20/20  0427   WBC  --    < > 16.9* 9.7   HGB  --    < > 15.5 13.7   HCT  --    < > 44.7 39.5*   PLT  --    < > 216 196   NA  --    < > 135* 138   K  --    < > 4.0 3.8   CL  --    < > 99 104   CO2  --    < > 25 23   BUN  --    < > 12 9   CREATININE  --    < > 0.7* 0.7*   MG  --    < >  --  2.00   PHOS  --   --   --  3.0   CALCIUM  --    < > 9.0 8.2*   AST  --    < > 13*  --    ALT  --    < > 15  --    BILITOT  --    < > 1.9*  --    NITRU Negative  --   --   --    COLORU YELLOW  --   --   --     < > = values in this interval not displayed. EDUCATION  Patient educated about Disease Process, Medications, Smoking Cessation, Oxygenation, Incentive Spirometry and Deep Breath and Cough, Diabetes, Hyperlipidemia, Smoking Cessation, Nutrition, Exercise and Hypertension    Electronically signed by TABBY Trimble CNP on 8/20/2020 at 1:11 PM      [facility]   [phone] Office  [phone] Cell     Agree with above note. The patient was personally seen and examined. Natty Domínguez is doing well. Tolerating clears. Abdominal pain improving. Abd exam as above, soft, ND, improved tenderness    WBC 9.7 from 16.9  Cr 0.7    A/P: 62 yo male with sigmoid diverticulitis    Improving  Continue IV abx  Low fiber diet. Will add colace.   DM - appreciate hospitalist assistance  Hopefully home tomorrow    Jeremias Azar MD

## 2020-08-21 VITALS
SYSTOLIC BLOOD PRESSURE: 145 MMHG | DIASTOLIC BLOOD PRESSURE: 84 MMHG | WEIGHT: 199.52 LBS | TEMPERATURE: 97.9 F | RESPIRATION RATE: 16 BRPM | OXYGEN SATURATION: 91 % | HEART RATE: 76 BPM | BODY MASS INDEX: 30.24 KG/M2 | HEIGHT: 68 IN

## 2020-08-21 LAB
GLUCOSE BLD-MCNC: 135 MG/DL (ref 70–99)
GLUCOSE BLD-MCNC: 135 MG/DL (ref 70–99)
GLUCOSE BLD-MCNC: 197 MG/DL (ref 70–99)
PERFORMED ON: ABNORMAL

## 2020-08-21 PROCEDURE — 6360000002 HC RX W HCPCS: Performed by: SURGERY

## 2020-08-21 PROCEDURE — 6370000000 HC RX 637 (ALT 250 FOR IP): Performed by: INTERNAL MEDICINE

## 2020-08-21 PROCEDURE — 2500000003 HC RX 250 WO HCPCS: Performed by: SURGERY

## 2020-08-21 PROCEDURE — 99238 HOSP IP/OBS DSCHRG MGMT 30/<: CPT | Performed by: SURGERY

## 2020-08-21 PROCEDURE — APPNB15 APP NON BILLABLE TIME 0-15 MINS: Performed by: NURSE PRACTITIONER

## 2020-08-21 PROCEDURE — 94760 N-INVAS EAR/PLS OXIMETRY 1: CPT

## 2020-08-21 PROCEDURE — 2580000003 HC RX 258: Performed by: SURGERY

## 2020-08-21 PROCEDURE — APPSS15 APP SPLIT SHARED TIME 0-15 MINUTES: Performed by: NURSE PRACTITIONER

## 2020-08-21 PROCEDURE — 6370000000 HC RX 637 (ALT 250 FOR IP): Performed by: SURGERY

## 2020-08-21 RX ORDER — AMOXICILLIN AND CLAVULANATE POTASSIUM 875; 125 MG/1; MG/1
1 TABLET, FILM COATED ORAL 2 TIMES DAILY
Qty: 24 TABLET | Refills: 0 | Status: SHIPPED | OUTPATIENT
Start: 2020-08-21 | End: 2020-09-02

## 2020-08-21 RX ORDER — POLYETHYLENE GLYCOL 3350 17 G/17G
17 POWDER, FOR SOLUTION ORAL DAILY
Qty: 1 BOTTLE | Refills: 1 | Status: SHIPPED | OUTPATIENT
Start: 2020-08-21 | End: 2020-09-20

## 2020-08-21 RX ADMIN — FAMOTIDINE 20 MG: 10 INJECTION, SOLUTION INTRAVENOUS at 08:37

## 2020-08-21 RX ADMIN — CIPROFLOXACIN 400 MG: 2 INJECTION, SOLUTION INTRAVENOUS at 01:09

## 2020-08-21 RX ADMIN — SODIUM CHLORIDE, PRESERVATIVE FREE 10 ML: 5 INJECTION INTRAVENOUS at 08:37

## 2020-08-21 RX ADMIN — ENOXAPARIN SODIUM 40 MG: 40 INJECTION SUBCUTANEOUS at 08:37

## 2020-08-21 RX ADMIN — DOCUSATE SODIUM 100 MG: 100 CAPSULE, LIQUID FILLED ORAL at 08:37

## 2020-08-21 RX ADMIN — INSULIN LISPRO 2 UNITS: 100 INJECTION, SOLUTION INTRAVENOUS; SUBCUTANEOUS at 12:14

## 2020-08-21 RX ADMIN — TAMSULOSIN HYDROCHLORIDE 0.4 MG: 0.4 CAPSULE ORAL at 08:37

## 2020-08-21 RX ADMIN — METRONIDAZOLE 500 MG: 500 INJECTION, SOLUTION INTRAVENOUS at 03:54

## 2020-08-21 RX ADMIN — ACETAMINOPHEN 650 MG: 325 TABLET ORAL at 01:08

## 2020-08-21 ASSESSMENT — PAIN DESCRIPTION - DESCRIPTORS
DESCRIPTORS: ACHING;SORE
DESCRIPTORS: ACHING;SORE
DESCRIPTORS_2: ACHING

## 2020-08-21 ASSESSMENT — PAIN DESCRIPTION - LOCATION
LOCATION: ABDOMEN;SHOULDER
LOCATION_2: SHOULDER
LOCATION: ABDOMEN;SHOULDER

## 2020-08-21 ASSESSMENT — PAIN DESCRIPTION - INTENSITY: RATING_2: 5

## 2020-08-21 ASSESSMENT — PAIN DESCRIPTION - PAIN TYPE
TYPE: ACUTE PAIN
TYPE: ACUTE PAIN
TYPE_2: CHRONIC PAIN

## 2020-08-21 ASSESSMENT — PAIN DESCRIPTION - ONSET
ONSET_2: ON-GOING
ONSET: ON-GOING
ONSET: ON-GOING

## 2020-08-21 ASSESSMENT — PAIN DESCRIPTION - ORIENTATION
ORIENTATION: MID;LOWER
ORIENTATION_2: LEFT;RIGHT

## 2020-08-21 ASSESSMENT — PAIN SCALES - GENERAL
PAINLEVEL_OUTOF10: 5
PAINLEVEL_OUTOF10: 5

## 2020-08-21 ASSESSMENT — PAIN - FUNCTIONAL ASSESSMENT: PAIN_FUNCTIONAL_ASSESSMENT: ACTIVITIES ARE NOT PREVENTED

## 2020-08-21 ASSESSMENT — PAIN DESCRIPTION - FREQUENCY
FREQUENCY: CONTINUOUS
FREQUENCY: INTERMITTENT

## 2020-08-21 ASSESSMENT — PAIN DESCRIPTION - PROGRESSION
CLINICAL_PROGRESSION: GRADUALLY IMPROVING
CLINICAL_PROGRESSION_2: NOT CHANGED

## 2020-08-21 ASSESSMENT — PAIN DESCRIPTION - DURATION: DURATION_2: CONTINUOUS

## 2020-08-21 NOTE — PLAN OF CARE
Problem: Pain:  Goal: Pain level will decrease  Description: Pain level will decrease  8/21/2020 1105 by Agusto Adrian RN  Outcome: Ongoing  Note: Pt able to express presence and absence of pain using numerical pain scale. Pt pain is managed by PRN analgesics as ordered by MD. Pain reassess after each interventions. Will continue to monitor as needed. Problem: Safety:  Goal: Free from accidental physical injury  Description: Free from accidental physical injury  8/21/2020 1105 by Agusto Adrian RN  Outcome: Ongoing  Note: Patient remains free from accidental injury. Precautions taken to ensure safe environment including bed in lowest position, wheels locked, and call light within reach. Measures taken to prevent accidental needle sticks and proper patient Identification.

## 2020-08-21 NOTE — PROGRESS NOTES
All verbal and written discharge instructions given to the pt at discharge regarding new meds and follow up appointments. Pt verbalized understandings.  Electronically signed by Brodie Garay RN on 8/21/2020 at 11:53 AM

## 2020-08-21 NOTE — PROGRESS NOTES
[facility] [phone]                                     Daily Progress Note                                                         Pt Name: Danica Godfrey  Medical Record Number: [id number]  Date of Birth 1965   Today's Date: 8/21/2020  CC: diverticulitis    ASSESSMENT/PLAN  Sigmoid diverticulitis  -Improving tenderness  -Bowels working. Some cramping after breakfast this AM but expected.   -tolerating low fiber diet  -WBC normalized. -DC home today on oral antibiotics. -continue low fiber diet for 2 weeks. F/U Dr. Gagnon Fueljocelyne 2 weeks. DM  -appreciate hospitalist assistance with management. -f/u with MELBA Harper CNP in 1-2 weeks. Discharge Planning: home today        Griselda Athens continues to improve  Pain is well controlled. He has no nausea and no vomiting. He has passed flatus and has not had a bowel movement. He is tolerating thin liquids. OBJECTIVE  VITALS:  height is 5' 8\" (1.727 m) and weight is 199 lb 8.3 oz (90.5 kg). His oral temperature is 97.9 °F (36.6 °C). His blood pressure is 145/84 (abnormal) and his pulse is 76. His respiration is 16 and oxygen saturation is 91%. INTAKE/OUTPUT:      Intake/Output Summary (Last 24 hours) at 8/21/2020 0912  Last data filed at 8/21/2020 8544  Gross per 24 hour   Intake 10 ml   Output --   Net 10 ml     GENERAL: alert, no distress  LUNGS: clear to ausculation, without wheezes, rales or rhonci  HEART: normal rate and regular rhythm  ABDOMEN: soft, improving suprapubic and LLQ tenderness, mild to moderate. Non distended. EXTREMITY: no cyanosis, clubbing or edema    I/O last 3 completed shifts:   In: 360 [P.O.:360]  Out: -       LABS  Recent Labs     08/19/20  0100  08/19/20  0707 08/20/20  0427   WBC  --    < > 16.9* 9.7   HGB  --    < > 15.5 13.7   HCT  --    < > 44.7 39.5*   PLT  --    < > 216 196   NA  --    < > 135* 138   K  --    < > 4.0 3.8   CL  --    < > 99 104   CO2  --    < > 25 23   BUN  --    < > 12 9   CREATININE  --    < > 0.7* 0.7*   MG  --    < >  --  2.00   PHOS  --   --   --  3.0   CALCIUM  --    < > 9.0 8.2*   AST  --    < > 13*  --    ALT  --    < > 15  --    BILITOT  --    < > 1.9*  --    NITRU Negative  --   --   --    COLORU YELLOW  --   --   --     < > = values in this interval not displayed. EDUCATION  Patient educated about Disease Process, Medications, Smoking Cessation, Oxygenation, Incentive Spirometry and Deep Breath and Cough, Diabetes, Hyperlipidemia, Smoking Cessation, Nutrition, Exercise and Hypertension    Electronically signed by TABBY Landry CNP on 8/21/2020 at [address] [facility]   [phone] Office  [phone] Cell     Agree with above note. The patient was personally seen and examined. Flo Borjas is doing well. Pain present but overall feeling better. Had bloating yesterday, improved today. Tolerating low fiber diet.  + BM. Abd soft, minimally distended, minimal LLQ tenderness, no peritonitis    A/P: sigmoid diverticulitis, DM    Doing better  Will d/c home. Will continue oral antibiotics x 12 days.   Follow up with me in 2 weeks, call for appointment    Karma Roy MD

## 2020-08-21 NOTE — PLAN OF CARE
Integrity:  Goal: Skin integrity will stabilize  Description: Skin integrity will stabilize  8/20/2020 2115 by Tatiana Wei  Outcome: Ongoing  8/20/2020 0809 by Sanjeev Montano RN  Outcome: Ongoing     Problem: Discharge Planning:  Goal: Patients continuum of care needs are met  Description: Patients continuum of care needs are met  8/20/2020 2115 by Tatiana Wei  Outcome: Ongoing  8/20/2020 0809 by Sanjeev Montano RN  Outcome: Ongoing     Problem: Discharge Planning:  Goal: Discharged to appropriate level of care  Description: Discharged to appropriate level of care  8/20/2020 2115 by Tatiana Wei  Outcome: Ongoing  8/20/2020 0809 by Sanjeev Montano RN  Outcome: Ongoing     Problem: Serum Glucose Level - Abnormal:  Goal: Ability to maintain appropriate glucose levels will improve  Description: Ability to maintain appropriate glucose levels will improve  8/20/2020 2115 by Tatiana Wei  Outcome: Ongoing  8/20/2020 0809 by aSnjeev Montano RN  Outcome: Ongoing     Problem: Sensory Perception - Impaired:  Goal: Ability to maintain a stable neurologic state will improve  Description: Ability to maintain a stable neurologic state will improve  8/20/2020 2115 by Tatiana Wei  Outcome: Ongoing  8/20/2020 0809 by Sanjeev Montano RN  Outcome: Ongoing     Problem: Nutritional:  Goal: Nutritional status will improve  Description: Nutritional status will improve  8/20/2020 2115 by Tatiana Wei  Outcome: Ongoing  8/20/2020 0809 by Sanjeev Montano RN  Outcome: Ongoing     Problem: Physical Regulation:  Goal: Will remain free from infection  Description: Will remain free from infection  8/20/2020 2115 by Tatiana Wei  Outcome: Ongoing  8/20/2020 0809 by Sanjeev Montano RN  Outcome: Ongoing  Goal: Diagnostic test results will improve  Description: Diagnostic test results will improve  8/20/2020 2115 by Tatiana Wei  Outcome: Ongoing  8/20/2020 0809 by Sanjeev Montano RN  Outcome: Ongoing  Goal: Ability to maintain vital signs within normal range will improve  Description: Ability to maintain vital signs within normal range will improve  8/20/2020 2115 by Kendrick Fernando  Outcome: Ongoing  8/20/2020 0809 by Adriana Hankins RN  Outcome: Ongoing     Problem: Respiratory:  Goal: Ability to maintain normal respiratory secretions will improve  Description: Ability to maintain normal respiratory secretions will improve  8/20/2020 2115 by Kendrick Fernando  Outcome: Ongoing  8/20/2020 0809 by Adriana Hankins RN  Outcome: Ongoing     Problem: Skin Integrity:  Goal: Demonstration of wound healing without infection will improve  Description: Demonstration of wound healing without infection will improve  8/20/2020 2115 by Kendrick Fernando  Outcome: Ongoing  8/20/2020 0809 by Adriana Hankins RN  Outcome: Ongoing  Goal: Complications related to intravenous access or infusion will be avoided or minimized  Description: Complications related to intravenous access or infusion will be avoided or minimized  8/20/2020 2115 by Kendrick Fernando  Outcome: Ongoing  8/20/2020 0809 by Adriana Hankins RN  Outcome: Ongoing

## 2020-08-21 NOTE — CARE COORDINATION
8/21 Patient has been discharged to home. Marisabel Jung has been arranged, Trip # [id number] (with [facility]). Patient will resume [facility] with [facility].  Electronically signed by Erlin Hinojosa RN on 8/21/2020 at 3:36 PM

## 2020-08-24 ENCOUNTER — TELEPHONE (OUTPATIENT)
Dept: INTERNAL MEDICINE CLINIC | Age: 55
End: 2020-08-24

## 2020-08-25 NOTE — DISCHARGE SUMMARY
Physician Discharge Summary     Patient ID:  Shasha Flores  [id number]  4 y.o.  1965    Admit date: 8/19/2020    Discharge date and time: 8/21/2020  4:33 PM     Admitting Physician: Erasmo Albrecht MD     Discharge Physician: same    Admission Diagnoses: Sigmoid diverticulitis [K57.32]    Discharge Diagnoses: same    Admission Condition: fair    Discharged Condition: good    Indication for Admission: same    Hospital Course:   Sigmoid diverticulitis  -Improving tenderness  -Bowels working. Some cramping after breakfast this AM but expected.   -tolerating low fiber diet  -WBC normalized. -DC home today on oral antibiotics. -continue low fiber diet for 2 weeks. F/U Dr. Payam Larios 2 weeks.      DM  -appreciate hospitalist assistance with management. -f/u with MELBA Harper CNP in 1-2 weeks. Consults: hospitalist    Significant Diagnostic Studies:   CT ABDOMEN PELVIS W IV CONTRAST Additional Contrast? None   Final Result   Acute sigmoid diverticulitis, possibly with a contained perforation (see   annotated images). No drainable fluid collection or rim enhancing abscess. Discharge Exam:  GENERAL: alert, no distress  LUNGS: clear to ausculation, without wheezes, rales or rhonci  HEART: normal rate and regular rhythm  ABDOMEN: soft, improving suprapubic and LLQ tenderness, mild to moderate. Non distended. EXTREMITY: no cyanosis, clubbing or edema  Disposition: home    In process/preliminary results:  Outstanding Order Results     No orders found from 7/21/2020 to 8/20/2020.           Patient Instructions:   Discharge Medication List as of 8/21/2020 11:12 AM      START taking these medications    Details   amoxicillin-clavulanate (AUGMENTIN) 875-125 MG per tablet Take 1 tablet by mouth 2 times daily for 12 days, Disp-24 tablet,R-0Normal      polyethylene glycol (GLYCOLAX) 17 GM/SCOOP powder Take 17 g by mouth daily, Disp-1 Bottle,R-1Normal         CONTINUE these medications which have NOT CHANGED    Details   insulin glargine (LANTUS SOLOSTAR) 100 UNIT/ML injection pen Inject 29 Units into the skin nightly, Disp-5 pen,R-2Normal      metFORMIN (GLUCOPHAGE XR) 500 MG extended release tablet Take 1 tablet by mouth nightly, Disp-30 tablet,R-0Normal      Alcohol Swabs (ALCOHOL PREP) PADS 3 TIMES DAILY Starting Tue 8/18/2020, Disp-100 each,R-3, Normal      blood glucose monitor strips Test 3 times a day & as needed for symptoms of irregular blood glucose. Dispense sufficient amount for indicated testing frequency plus additional to accommodate PRN testing needs. , Disp-100 strip,R-3, Normal      sildenafil (VIAGRA) 50 MG tablet Take 1 tablet by mouth daily as needed for Erectile Dysfunction, Disp-10 tablet,R-0Normal      glucose monitoring kit (FREESTYLE) monitoring kit DAILY Starting Thu 6/18/2020, Disp-1 kit, R-0, Normal      Insulin Pen Needle (PEN NEEDLES 3/16\") 31G X 5 MM MISC DAILY Starting Thu 6/18/2020, Disp-100 each, R-3, Normal      Lancets MISC 3 TIMES DAILY Starting Thu 6/18/2020, Disp-100 each, R-3, Normal      atorvastatin (LIPITOR) 40 MG tablet Take 1 tablet by mouth daily, Disp-30 tablet, R-3Normal      meloxicam (MOBIC) 15 MG tablet TK 1 T PO QDHistorical Med           Activity: activity as tolerated  Diet: low fiber  Wound Care: none needed    Follow-up with Dr. Kalin Pittman in 2 weeks.     SignedRobie Vinny APRN-CNP 3:03 PM 8/25/2020   [facility]  Office: [phone]   8/25/2020  3:01 PM

## 2021-02-05 ENCOUNTER — OFFICE VISIT (OUTPATIENT)
Dept: PRIMARY CARE CLINIC | Age: 56
End: 2021-02-05
Payer: COMMERCIAL

## 2021-02-05 DIAGNOSIS — Z01.812 PRE-PROCEDURAL LABORATORY EXAMINATIONS: Primary | ICD-10-CM

## 2021-02-05 LAB — SARS-COV-2: NOT DETECTED

## 2021-02-05 PROCEDURE — 99211 OFF/OP EST MAY X REQ PHY/QHP: CPT | Performed by: NURSE PRACTITIONER

## 2021-02-11 ENCOUNTER — HOSPITAL ENCOUNTER (OUTPATIENT)
Dept: PULMONOLOGY | Age: 56
Discharge: HOME | End: 2021-02-11
Payer: COMMERCIAL

## 2021-02-11 DIAGNOSIS — R06.09 DYSPNEA ON EXERTION: ICD-10-CM

## 2021-02-11 PROCEDURE — 94729 DIFFUSING CAPACITY: CPT

## 2021-02-11 PROCEDURE — 94726 PLETHYSMOGRAPHY LUNG VOLUMES: CPT | Performed by: INTERNAL MEDICINE

## 2021-02-11 PROCEDURE — 94060 EVALUATION OF WHEEZING: CPT | Performed by: INTERNAL MEDICINE

## 2021-02-11 PROCEDURE — 94640 AIRWAY INHALATION TREATMENT: CPT

## 2021-02-11 PROCEDURE — 94664 DEMO&/EVAL PT USE INHALER: CPT

## 2021-02-11 PROCEDURE — 6370000000 HC RX 637 (ALT 250 FOR IP): Performed by: NURSE PRACTITIONER

## 2021-02-11 PROCEDURE — 94060 EVALUATION OF WHEEZING: CPT

## 2021-02-11 PROCEDURE — 94729 DIFFUSING CAPACITY: CPT | Performed by: INTERNAL MEDICINE

## 2021-02-11 PROCEDURE — 94726 PLETHYSMOGRAPHY LUNG VOLUMES: CPT

## 2021-02-11 RX ORDER — ALBUTEROL SULFATE 90 UG/1
4 AEROSOL, METERED RESPIRATORY (INHALATION) ONCE
Status: COMPLETED | OUTPATIENT
Start: 2021-02-11 | End: 2021-02-11

## 2021-02-11 RX ADMIN — Medication 4 PUFF: at 10:41

## 2021-02-12 LAB
DLCO %PRED: 86 %
DLCO PRED: NORMAL
DLCO/VA %PRED: NORMAL
DLCO/VA PRED: NORMAL
DLCO/VA: NORMAL
DLCO: NORMAL
EXPIRATORY TIME-POST: NORMAL
EXPIRATORY TIME: NORMAL
FEF 25-75% %CHNG: NORMAL
FEF 25-75% %PRED-POST: NORMAL
FEF 25-75% %PRED-PRE: NORMAL
FEF 25-75% PRED: NORMAL
FEF 25-75%-POST: NORMAL
FEF 25-75%-PRE: NORMAL
FEV1 %PRED-POST: 92 %
FEV1 %PRED-PRE: 91 %
FEV1 PRED: NORMAL
FEV1-POST: NORMAL
FEV1-PRE: NORMAL
FEV1/FVC %PRED-POST: NORMAL
FEV1/FVC %PRED-PRE: NORMAL
FEV1/FVC PRED: NORMAL
FEV1/FVC-POST: 85 %
FEV1/FVC-PRE: 83 %
FVC %PRED-POST: NORMAL
FVC %PRED-PRE: NORMAL
FVC PRED: NORMAL
FVC-POST: NORMAL
FVC-PRE: NORMAL
GAW %PRED: NORMAL
GAW PRED: NORMAL
GAW: NORMAL
IC %PRED: NORMAL
IC PRED: NORMAL
IC: NORMAL
MEP: NORMAL
MIP: NORMAL
MVV %PRED-PRE: NORMAL
MVV PRED: NORMAL
MVV-PRE: NORMAL
PEF %PRED-POST: NORMAL
PEF %PRED-PRE: NORMAL
PEF PRED: NORMAL
PEF%CHNG: NORMAL
PEF-POST: NORMAL
PEF-PRE: NORMAL
RAW %PRED: NORMAL
RAW PRED: NORMAL
RAW: NORMAL
RV %PRED: NORMAL
RV PRED: NORMAL
RV: NORMAL
SVC %PRED: NORMAL
SVC PRED: NORMAL
SVC: NORMAL
TLC %PRED: 78 %
TLC PRED: NORMAL
TLC: NORMAL
VA %PRED: NORMAL
VA PRED: NORMAL
VA: NORMAL
VTG %PRED: NORMAL
VTG PRED: NORMAL
VTG: NORMAL

## 2021-02-12 ASSESSMENT — PULMONARY FUNCTION TESTS
FEV1_PERCENT_PREDICTED_POST: 92
FEV1_PERCENT_PREDICTED_PRE: 91
FEV1/FVC_POST: 85
FEV1/FVC_PRE: 83

## 2023-10-30 ENCOUNTER — HOSPITAL ENCOUNTER (OUTPATIENT)
Dept: GENERAL RADIOLOGY | Age: 58
Discharge: HOME | End: 2023-10-30
Attending: INTERNAL MEDICINE
Payer: COMMERCIAL

## 2023-10-30 ENCOUNTER — HOSPITAL ENCOUNTER (OUTPATIENT)
Age: 58
Discharge: HOME | End: 2023-10-30
Payer: COMMERCIAL

## 2023-10-30 DIAGNOSIS — J40 BRONCHITIS: ICD-10-CM

## 2023-10-30 PROCEDURE — 71046 X-RAY EXAM CHEST 2 VIEWS: CPT

## 2024-02-05 DIAGNOSIS — E11.65 UNCONTROLLED TYPE 2 DIABETES MELLITUS WITH HYPERGLYCEMIA (HCC): ICD-10-CM

## 2024-02-05 LAB
EST. AVERAGE GLUCOSE BLD GHB EST-MCNC: 180 MG/DL
HBA1C MFR BLD: 7.9 %